# Patient Record
Sex: FEMALE | NOT HISPANIC OR LATINO | Employment: UNEMPLOYED | URBAN - METROPOLITAN AREA
[De-identification: names, ages, dates, MRNs, and addresses within clinical notes are randomized per-mention and may not be internally consistent; named-entity substitution may affect disease eponyms.]

---

## 2017-04-17 ENCOUNTER — HOSPITAL ENCOUNTER (OUTPATIENT)
Facility: HOSPITAL | Age: 32
Discharge: HOME | End: 2017-04-18
Attending: SPECIALIST | Admitting: INTERNAL MEDICINE
Payer: MEDICARE

## 2017-04-17 DIAGNOSIS — R55 SYNCOPE: ICD-10-CM

## 2017-04-17 DIAGNOSIS — R55 SYNCOPE AND COLLAPSE: Primary | ICD-10-CM

## 2017-04-17 DIAGNOSIS — R00.1 BRADYCARDIA: ICD-10-CM

## 2017-04-17 DIAGNOSIS — R42 DIZZINESS: ICD-10-CM

## 2017-04-17 LAB
ALBUMIN SERPL BCP-MCNC: 4.4 G/DL
ALP SERPL-CCNC: 97 U/L
ALT SERPL W/O P-5'-P-CCNC: 20 U/L
ANION GAP SERPL CALC-SCNC: 10 MMOL/L
APTT BLDCRRT: 28 SEC
AST SERPL-CCNC: 21 U/L
B-HCG UR QL: NEGATIVE
BASOPHILS # BLD AUTO: 0.02 K/UL
BASOPHILS NFR BLD: 0.5 %
BILIRUB SERPL-MCNC: 1.1 MG/DL
BILIRUB UR QL STRIP: NEGATIVE
BNP SERPL-MCNC: 35 PG/ML
BUN SERPL-MCNC: 15 MG/DL
CALCIUM SERPL-MCNC: 9.9 MG/DL
CHLORIDE SERPL-SCNC: 103 MMOL/L
CK SERPL-CCNC: 63 U/L
CLARITY UR: CLEAR
CO2 SERPL-SCNC: 29 MMOL/L
COLOR UR: YELLOW
CREAT SERPL-MCNC: 0.9 MG/DL
DIASTOLIC DYSFUNCTION: NO
DIFFERENTIAL METHOD: ABNORMAL
EOSINOPHIL # BLD AUTO: 0.1 K/UL
EOSINOPHIL NFR BLD: 1.4 %
ERYTHROCYTE [DISTWIDTH] IN BLOOD BY AUTOMATED COUNT: 13.2 %
EST. GFR  (AFRICAN AMERICAN): >60 ML/MIN/1.73 M^2
EST. GFR  (NON AFRICAN AMERICAN): >60 ML/MIN/1.73 M^2
GLUCOSE SERPL-MCNC: 87 MG/DL
GLUCOSE UR QL STRIP: NEGATIVE
HCT VFR BLD AUTO: 42.7 %
HGB BLD-MCNC: 14.7 G/DL
HGB UR QL STRIP: NEGATIVE
INR PPP: 1
KETONES UR QL STRIP: NEGATIVE
LEUKOCYTE ESTERASE UR QL STRIP: NEGATIVE
LYMPHOCYTES # BLD AUTO: 1.6 K/UL
LYMPHOCYTES NFR BLD: 38.1 %
MAGNESIUM SERPL-MCNC: 2.3 MG/DL
MCH RBC QN AUTO: 33.3 PG
MCHC RBC AUTO-ENTMCNC: 34.4 %
MCV RBC AUTO: 97 FL
MONOCYTES # BLD AUTO: 0.4 K/UL
MONOCYTES NFR BLD: 8.7 %
NEUTROPHILS # BLD AUTO: 2.1 K/UL
NEUTROPHILS NFR BLD: 51.3 %
NITRITE UR QL STRIP: NEGATIVE
PH UR STRIP: 6 [PH] (ref 5–8)
PLATELET # BLD AUTO: 298 K/UL
PMV BLD AUTO: 10.3 FL
POCT GLUCOSE: 62 MG/DL (ref 70–110)
POTASSIUM SERPL-SCNC: 4.3 MMOL/L
PROT SERPL-MCNC: 7.8 G/DL
PROT UR QL STRIP: NEGATIVE
PROTHROMBIN TIME: 10.8 SEC
RBC # BLD AUTO: 4.42 M/UL
RETIRED EF AND QEF - SEE NOTES: 65 (ref 55–65)
SODIUM SERPL-SCNC: 142 MMOL/L
SP GR UR STRIP: <=1.005 (ref 1–1.03)
TROPONIN I SERPL DL<=0.01 NG/ML-MCNC: <0.006 NG/ML
URN SPEC COLLECT METH UR: ABNORMAL
UROBILINOGEN UR STRIP-ACNC: NEGATIVE EU/DL
WBC # BLD AUTO: 4.15 K/UL

## 2017-04-17 PROCEDURE — 25000003 PHARM REV CODE 250: Performed by: SPECIALIST

## 2017-04-17 PROCEDURE — 93010 ELECTROCARDIOGRAM REPORT: CPT | Mod: ,,, | Performed by: INTERNAL MEDICINE

## 2017-04-17 PROCEDURE — 99204 OFFICE O/P NEW MOD 45 MIN: CPT | Mod: ,,, | Performed by: INTERNAL MEDICINE

## 2017-04-17 PROCEDURE — 93306 TTE W/DOPPLER COMPLETE: CPT

## 2017-04-17 PROCEDURE — 93005 ELECTROCARDIOGRAM TRACING: CPT

## 2017-04-17 PROCEDURE — 81025 URINE PREGNANCY TEST: CPT

## 2017-04-17 PROCEDURE — 85025 COMPLETE CBC W/AUTO DIFF WBC: CPT

## 2017-04-17 PROCEDURE — 85730 THROMBOPLASTIN TIME PARTIAL: CPT

## 2017-04-17 PROCEDURE — 83735 ASSAY OF MAGNESIUM: CPT

## 2017-04-17 PROCEDURE — G0378 HOSPITAL OBSERVATION PER HR: HCPCS

## 2017-04-17 PROCEDURE — 93306 TTE W/DOPPLER COMPLETE: CPT | Mod: 26,,, | Performed by: INTERNAL MEDICINE

## 2017-04-17 PROCEDURE — 83880 ASSAY OF NATRIURETIC PEPTIDE: CPT

## 2017-04-17 PROCEDURE — 82550 ASSAY OF CK (CPK): CPT

## 2017-04-17 PROCEDURE — 85610 PROTHROMBIN TIME: CPT

## 2017-04-17 PROCEDURE — 99285 EMERGENCY DEPT VISIT HI MDM: CPT | Mod: 25

## 2017-04-17 PROCEDURE — 84484 ASSAY OF TROPONIN QUANT: CPT

## 2017-04-17 PROCEDURE — 81003 URINALYSIS AUTO W/O SCOPE: CPT

## 2017-04-17 PROCEDURE — 96361 HYDRATE IV INFUSION ADD-ON: CPT

## 2017-04-17 PROCEDURE — 96360 HYDRATION IV INFUSION INIT: CPT

## 2017-04-17 PROCEDURE — 80053 COMPREHEN METABOLIC PANEL: CPT

## 2017-04-17 PROCEDURE — 82962 GLUCOSE BLOOD TEST: CPT

## 2017-04-17 RX ORDER — ENOXAPARIN SODIUM 100 MG/ML
40 INJECTION SUBCUTANEOUS EVERY 24 HOURS
Status: DISCONTINUED | OUTPATIENT
Start: 2017-04-18 | End: 2017-04-18 | Stop reason: HOSPADM

## 2017-04-17 RX ORDER — ACETAMINOPHEN 325 MG/1
650 TABLET ORAL EVERY 6 HOURS PRN
Status: DISCONTINUED | OUTPATIENT
Start: 2017-04-17 | End: 2017-04-18 | Stop reason: HOSPADM

## 2017-04-17 RX ORDER — ALPRAZOLAM 2 MG/1
TABLET ORAL 2 TIMES DAILY
Status: ON HOLD | COMMUNITY
End: 2019-02-02 | Stop reason: HOSPADM

## 2017-04-17 RX ORDER — SODIUM CHLORIDE 9 MG/ML
INJECTION, SOLUTION INTRAVENOUS CONTINUOUS
Status: DISCONTINUED | OUTPATIENT
Start: 2017-04-17 | End: 2017-04-18 | Stop reason: HOSPADM

## 2017-04-17 RX ORDER — FAMOTIDINE 10 MG/ML
20 INJECTION INTRAVENOUS EVERY 12 HOURS
Status: DISCONTINUED | OUTPATIENT
Start: 2017-04-17 | End: 2017-04-17

## 2017-04-17 RX ORDER — FAMOTIDINE 20 MG/1
20 TABLET, FILM COATED ORAL DAILY
Status: DISCONTINUED | OUTPATIENT
Start: 2017-04-18 | End: 2017-04-18 | Stop reason: HOSPADM

## 2017-04-17 RX ADMIN — SODIUM CHLORIDE 1000 ML: 0.9 INJECTION, SOLUTION INTRAVENOUS at 10:04

## 2017-04-17 RX ADMIN — SODIUM CHLORIDE 1000 ML: 0.9 INJECTION, SOLUTION INTRAVENOUS at 02:04

## 2017-04-17 RX ADMIN — SODIUM CHLORIDE: 0.9 INJECTION, SOLUTION INTRAVENOUS at 10:04

## 2017-04-17 NOTE — IP AVS SNAPSHOT
[address] [place] [address]           Patient Discharge Instructions   Our goal is to set you up for success. This packet includes information on your condition, medications, and your home care.  It will help you care for yourself to prevent having to return to the hospital.     Please ask your nurse if you have any questions.      There are many details to remember when preparing to leave the hospital. Here is what you will need to do:    1. Take your medicine. If you are prescribed medications, review your Medication List on the following pages. You may have new medications to  at the pharmacy and others that you'll need to stop taking. Review the instructions for how and when to take your medications. Talk with your doctor or nurses if you are unsure of what to do.     2. Go to your follow-up appointments. Specific follow-up information is listed in the following pages. Your may be contacted by a nurse or clinical provider about future appointments. Be sure we have all of the phone numbers to reach you. Please contact your provider's office if you are unable to make an appointment.     3. Watch for warning signs. Your doctor or nurse will give you detailed warning signs to watch for and when to call for assistance. These instructions may also include educational information about your condition. If you experience any of warning signs to your health, call your doctor.               ** Verify the list of medication(s) below is accurate and up to date. Carry this with you in case of emergency. If your medications have changed, please notify your healthcare provider.             Medication List      CONTINUE taking these medications        Additional Info                      XANAX 2 MG Tab   Refills:  0   Generic drug:  alprazolam    Instructions:  Take by mouth nightly as needed.     Begin Date    AM    Noon    PM    Bedtime         STOP taking these  "medications     meloxicam 15 MG tablet   Commonly known as:  MOBIC                  Please bring to all follow up appointments:    1. A copy of your discharge instructions.  2. All medicines you are currently taking in their original bottles.  3. Identification and insurance card.    Please arrive 15 minutes ahead of scheduled appointment time.    Please call 24 hours in advance if you must reschedule your appointment and/or time.        Follow-up Information     Follow up with Catalino Mcgrath MD. Schedule an appointment as soon as possible for a visit in 3 days.    Specialty:  Internal Medicine    Contact information:    [address] [place] [id number]  [phone]          Discharge Instructions     Future Orders    Diet general     Questions:    Total calories:      Fat restriction, if any:      Protein restriction, if any:      Na restriction, if any:      Fluid restriction:      Additional restrictions:          Primary Diagnosis     Your primary diagnosis was:  Fainting      Admission Information     Date & Time Provider Department CSN    4/17/2017  9:18 AM Marko Keating MD [facility] -  [id number]      Care Providers     Provider Role Specialty Primary office phone    Marko Keating MD Attending Provider Internal Medicine [phone]    Marko Keating MD Team Attending  Internal Medicine [phone]    Loco Vergara MD Consulting Physician  Cardiology  [phone]      Your Vitals Were     BP Pulse Temp Resp Height Weight    116/69 (BP Location: Right arm, Patient Position: Lying, BP Method: Automatic) 57 98 °F (36.7 °C) (Oral) 18 5' 11" (1.803 m) 70.3 kg (155 lb)    SpO2 BMI             97% 21.62 kg/m2         Recent Lab Values     No lab values to display.      Allergies as of 4/18/2017     No Known Allergies      [facility]     [facility] Nurse Care Line - 24/7 Assistance  Unless otherwise directed by your provider, please contact " [facility], our nurse care line that is available for 24/7 assistance.     Registered nurses in the [facility] provide clinical advisement, health education, appointment booking, and other advisory services.  Call for this free service at [phone].        Advance Directives     An advance directive is a document which, in the event you are no longer able to make decisions for yourself, tells your healthcare team what kind of treatment you do or do not want to receive, or who you would like to make those decisions for you.  If you do not currently have an advance directive, [facility] encourages you to create one.  For more information call:  [phone]), [phone] ([phone]),  or log on to [email or web address].        Smoking Cessation     If you would like to quit smoking:   You may be eligible for free services if you are a [place] resident and started smoking cigarettes before September 1, 1988.  Call the Smoking Cessation Trust ([facility]) toll free at ([phone] 707-6632 or [phone] 106-3465.   Call 0-076-QUIT-NOW if you do not meet the above criteria.   Contact us via email: [email or web address]   View our website for more information: [email or web address]        Language Assistance Services     ATTENTION: Language assistance services are available, free of charge. Please call [phone].      ATENCIÓN: Si habla español, tiene a stark disposición servicios gratuitos de asistencia lingüística. Llame al [phone].     [facility] Ý: N?u b?n nói Ti?ng Vi?t, có các d?ch v? h? tr? ngôn ng? mi?n phí dành cho b?n. G?i s? [phone].        [facility] Sign-Up     Activating your [facility] account is as easy as 1-2-3!     1) Visit [email or web address], select Sign Up Now, enter this activation code and your date of birth, then select Next.  [email or web address]  Expires: 6/2/2017  2:46 PM      2) Create a username and password to use when you visit [facility] in the future and  select a security question in case you lose your password and select Next.    3) Enter your e-mail address and click Sign Up!    Additional Information  If you have questions, please e-mail [email or web address] or call [phone] to talk to our [facility] staff. Remember, [facility] is NOT to be used for urgent needs. For medical emergencies, dial 911.          [facility] complies with applicable Federal civil rights laws and does not discriminate on the basis of race, color, national origin, age, disability, or sex.

## 2017-04-17 NOTE — ED NOTES
"Pt reports this weekend, everything went black and her knees felt weak.   She felt like she was floating and had to sit down.  Her vision felt altered and she saw "speckles".  She had repeated episodes on Saturday with some improvement on Sunday.  On waking this morning, she had no dizziness and no altered vision.  While driving to work, her vision became altered when she turned her head; she describes it as "delayed".  "

## 2017-04-17 NOTE — ED NOTES
Pt resting in ER stretcher, aaox4, rr e/u, NAD noted. Pt remains on cardiac monitor with vss noted. Bed low and locked, call light in reach, side rails up x2. Pt verbalized understanding of status and POC; denies further needs. Visitors at bedside.  Will continue to monitor.

## 2017-04-17 NOTE — CONSULTS
[facility]  Cardiology  Consult Note    Patient Name: Aliyah Up  MRN: [id number]  Admission Date: 2017  Hospital Length of Stay: 0 days  Code Status: No Order   Attending Provider: Danya Del Toro MD   Consulting Provider: Subha Vergara MD  Primary Care Physician: Catalino Mcgrath MD  Principal Problem:<principal problem not specified>    Patient information was obtained from patient and ER records.     Inpatient consult to Cardiology  Consult performed by: SUBHA VERGARA  Consult ordered by: DANYA LAZO        Subjective:     Chief Complaint:  Lightheadedness.     HPI: 30 yo female, PMH anxiety,bipolar and depression, working as receptionalist. Two days ago, started lightheaded and fainting when standing up at home. Associated with black vision for a moment. Felt vision moving behind the turning of head and the head was  Leaving her body. Constant symptoms for a day. Then yesterday, repeat symptoms.   She states that she always has lightheadedness and dizziness when standing up, which could resovle quickly in seconds.  In ER, EKG showed sinus bradycardia at 45 bpm and had one liter of IVF.  Recheck orthostatic sign:  Lying /81 mmHG, HR 45 bpm  Sitting /95 mmHG and HR 68 bpm  Standing /80 mmHG and HR 90 bpm/        Past Medical History:   Diagnosis Date    Anxiety     Bipolar and related disorder     Depression     Memory loss     Mental disorder     Syncope and collapse        Past Surgical History:   Procedure Laterality Date    Breast implants  2005     SECTION, CLASSIC      HYSTERECTOMY         Review of patient's allergies indicates:  No Known Allergies    No current facility-administered medications on file prior to encounter.      Current Outpatient Prescriptions on File Prior to Encounter   Medication Sig    meloxicam (MOBIC) 15 MG tablet Take 1 tablet (15 mg total) by mouth once daily.     Family History     Problem Relation  (Age of Onset)    Hypertension Mother        Social History Main Topics    Smoking status: Former Smoker     Quit date: 2/13/2013    Smokeless tobacco: Not on file    Alcohol use Yes      Comment: OCCASION    Drug use: No    Sexual activity: Yes     Partners: Male     Review of Systems   Constitution: Negative for decreased appetite, diaphoresis, fever, weakness, malaise/fatigue and night sweats.   HENT: Negative for headaches and nosebleeds.    Eyes: Negative for blurred vision and double vision.   Cardiovascular: Positive for near-syncope. Negative for chest pain, claudication, dyspnea on exertion, irregular heartbeat, leg swelling, orthopnea, palpitations, paroxysmal nocturnal dyspnea and syncope.   Respiratory: Negative for cough, shortness of breath, sleep disturbances due to breathing, snoring, sputum production and wheezing.    Endocrine: Negative for cold intolerance and polyuria.   Hematologic/Lymphatic: Does not bruise/bleed easily.   Skin: Negative for rash.   Musculoskeletal: Negative for back pain, falls, joint pain, joint swelling and neck pain.   Gastrointestinal: Negative for abdominal pain, heartburn, nausea and vomiting.   Genitourinary: Negative for dysuria, frequency and hematuria.   Neurological: Positive for dizziness and light-headedness. Negative for difficulty with concentration, focal weakness, numbness and seizures.   Psychiatric/Behavioral: Negative for depression, memory loss and substance abuse. The patient does not have insomnia.    Allergic/Immunologic: Negative for HIV exposure and hives.     Objective:     Vital Signs (Most Recent):  Temp: 97.7 °F (36.5 °C) (04/17/17 0909)  Pulse: 73 (04/17/17 1306)  Resp: 19 (04/17/17 1215)  BP: 121/85 (04/17/17 1306)  SpO2: 100 % (04/17/17 1306) Vital Signs (24h Range):  Temp:  [97.7 °F (36.5 °C)] 97.7 °F (36.5 °C)  Pulse:  [51-73] 73  Resp:  [16-19] 19  SpO2:  [98 %-100 %] 100 %  BP: (115-145)/(68-85) 121/85     Weight: 70.3 kg (155  lb)  Body mass index is 21.62 kg/(m^2).    SpO2: 100 %  O2 Device (Oxygen Therapy): room air    No intake or output data in the 24 hours ending 04/17/17 1400    Lines/Drains/Airways     Peripheral Intravenous Line                 Peripheral IV - Single Lumen 04/17/17 1037 Right Antecubital less than 1 day                Physical Exam   Constitutional: She is oriented to person, place, and time. She appears well-nourished.   HENT:   Head: Normocephalic.   Eyes: Pupils are equal, round, and reactive to light.   Neck: Normal carotid pulses and no JVD present. Carotid bruit is not present. No thyromegaly present.   Cardiovascular: Regular rhythm, normal heart sounds and normal pulses.   No extrasystoles are present. Bradycardia present.  PMI is not displaced.  Exam reveals no gallop and no S3.    No murmur heard.  Pulmonary/Chest: Breath sounds normal. No stridor. No respiratory distress.   Abdominal: Soft. Bowel sounds are normal. There is no tenderness. There is no rebound.   Musculoskeletal: Normal range of motion.   Neurological: She is alert and oriented to person, place, and time.   Skin: Skin is intact. No rash noted.   Psychiatric: Her behavior is normal.       Significant Labs:   ABG: No results for input(s): PH, PCO2, HCO3, POCSATURATED, BE in the last 48 hours., Blood Culture: No results for input(s): LABBLOO in the last 48 hours., BMP:   Recent Labs  Lab 04/17/17  1037   GLU 87      K 4.3      CO2 29   BUN 15   CREATININE 0.9   CALCIUM 9.9   MG 2.3   , CMP   Recent Labs  Lab 04/17/17  1037      K 4.3      CO2 29   GLU 87   BUN 15   CREATININE 0.9   CALCIUM 9.9   PROT 7.8   ALBUMIN 4.4   BILITOT 1.1*   ALKPHOS 97   AST 21   ALT 20   ANIONGAP 10   ESTGFRAFRICA >60   EGFRNONAA >60   , CBC   Recent Labs  Lab 04/17/17  1037   WBC 4.15   HGB 14.7   HCT 42.7      , INR   Recent Labs  Lab 04/17/17  1037   INR 1.0   , Lipid Panel No results for input(s): CHOL, HDL, LDLCALC, TRIG,  CHOLHDL in the last 48 hours. and Troponin   Recent Labs  Lab 04/17/17  1037   TROPONINI <0.006       Significant Imaging: X-Ray: CXR: X-Ray Chest 1 View (CXR):   Results for orders placed or performed during the hospital encounter of 04/17/17   X-Ray Chest 1 View    Narrative    History: Dizziness, shortness of breath    There is cardiomegaly. No infiltrate seen. Haziness of the lower lung fields produced by prominent overlying soft tissues and breast implants. There is moderate scoliosis.    Impression     No acute findings. Cardiomegaly. Scoliosis.      Electronically signed by: CATALINA HARRINGTON MD  Date:     04/17/17  Time:    10:25      Assessment and Plan:     Orthostatic tachycardia symptomatic  baselie bradycardia at 40's.  Anxiety and bipolar.    Plan: IVF   Echo pending  Tele monitor    VTE Risk Mitigation     None          Thank you for your consult. I will follow-up with patient. Please contact us if you have any additional questions.    Loco Vergara MD  Cardiology   [facility]

## 2017-04-17 NOTE — ED PROVIDER NOTES
"SCRIBE #1 NOTE: I, Andrés Champagne, am scribing for, and in the presence of, Liat Del Toro MD. I have scribed the entire note.      History      Chief Complaint   Patient presents with    Dizziness     started 3 days ago, "I blacked out", denies loss of consciousness, felt better yesterday, but this morning feels dizzy again.       Review of patient's allergies indicates:  No Known Allergies     HPI   HPI    2017, 9:56 AM   History obtained from the patient and       History of Present Illness: Aliyah Up is a 31 y.o. female patient with a PMHx of anxiety, who presents to the Emergency Department for dizziness which onset gradually 2 days ago. Sxs are episodic and moderate in severity. Pt reports syncopal event while standing 2 days ago. Pt denies any head trauma or fall. Pt states that she is still feeling dizzy and describes sxs as an "out of body feeling". There are no mitigating or exacerbating factors noted.  Pt denies any fever, N/V/D, HA, SOB, CP, numbness, abd pain, and all other sxs at this time. No further complaints or concerns at this time.       Arrival mode: Personal vehicle      PCP: Catalino Mcgrath MD       Past Medical History:  Past Medical History:   Diagnosis Date    Anxiety     Bipolar and related disorder     Depression     Memory loss     Mental disorder     Syncope and collapse        Past Surgical History:  Past Surgical History:   Procedure Laterality Date    Breast implants  2005     SECTION, CLASSIC  2003    HYSTERECTOMY           Family History:  Family History   Problem Relation Age of Onset    Hypertension Mother        Social History:  Social History     Social History Main Topics    Smoking status: Former Smoker     Quit date: 2013    Smokeless tobacco: unknown    Alcohol use Yes      Comment: OCCASION    Drug use: No    Sexual activity: Yes     Partners: Male       ROS   Review of Systems   Constitutional: Negative " for fever.   HENT: Negative for sore throat.    Respiratory: Negative for shortness of breath.    Cardiovascular: Negative for chest pain, palpitations and leg swelling.   Gastrointestinal: Negative for abdominal pain, diarrhea, nausea and vomiting.   Genitourinary: Negative for dysuria.   Musculoskeletal: Negative for back pain.   Skin: Negative for rash.   Neurological: Positive for dizziness and syncope. Negative for weakness.   Hematological: Does not bruise/bleed easily.       Physical Exam    Initial Vitals   BP Pulse Resp Temp SpO2   04/17/17 0909 04/17/17 0909 04/17/17 0909 04/17/17 0909 04/17/17 0909   145/76 56 16 97.7 °F (36.5 °C) 100 %      Physical Exam  Nursing Notes and Vital Signs Reviewed.  Constitutional: Patient is in no acute distress. Awake and alert. Well-developed and well-nourished.  Head: Atraumatic. Normocephalic.  Eyes: PERRL. EOM intact. Conjunctivae are not pale. No scleral icterus.  ENT: Mucous membranes are moist. Oropharynx is clear and symmetric.    Neck: Supple. Full ROM. No lymphadenopathy.  Cardiovascular: Bradycardia. Regular rhythm. No murmurs, rubs, or gallops. Distal pulses are 2+ and symmetric.  Pulmonary/Chest: No respiratory distress. Clear to auscultation bilaterally. No wheezing, rales, or rhonchi.  Abdominal: Soft and non-distended.  There is no tenderness.  No rebound, guarding, or rigidity. Good bowel sounds.  Genitourinary: No CVA tenderness  Musculoskeletal: Moves all extremities. No obvious deformities. No edema. No calf tenderness.  Skin: Warm and dry.  Neurological:  Alert, awake, and appropriate.  Normal speech.  No acute focal neurological deficits are appreciated.  Psychiatric: Normal affect. Good eye contact. Appropriate in content.    ED Course    Procedures  ED Vital Signs:  Vitals:    04/17/17 0909 04/17/17 1044 04/17/17 1048 04/17/17 1215   BP: (!) 145/76 125/83  117/68   Pulse: (!) 56 61 (!) 52 (!) 54   Resp: 16  16 19   Temp: 97.7 °F (36.5 °C)     "  TempSrc: Oral      SpO2: 100% 98% 100% 99%   Weight: 70.3 kg (155 lb)      Height: 5' 11" (1.803 m)       04/17/17 1302 04/17/17 1304 04/17/17 1306 04/17/17 1458   BP: 115/69 121/75 121/85 120/74   Pulse: (!) 51 (!) 53 73 (!) 45   Resp:    18   Temp:       TempSrc:       SpO2: 98% 99% 100% 100%   Weight:       Height:           Abnormal Lab Results:  Labs Reviewed   CBC W/ AUTO DIFFERENTIAL - Abnormal; Notable for the following:        Result Value    MCH 33.3 (*)     All other components within normal limits   COMPREHENSIVE METABOLIC PANEL - Abnormal; Notable for the following:     Total Bilirubin 1.1 (*)     All other components within normal limits   URINALYSIS - Abnormal; Notable for the following:     Specific Gravity, UA <=1.005 (*)     All other components within normal limits   POCT GLUCOSE - Abnormal; Notable for the following:     POCT Glucose 62 (*)     All other components within normal limits   MAGNESIUM   CK   TROPONIN I   PREGNANCY TEST, URINE RAPID   PROTIME-INR   APTT   B-TYPE NATRIURETIC PEPTIDE   POCT GLUCOSE MONITORING CONTINUOUS        All Lab Results:  Results for orders placed or performed during the hospital encounter of 04/17/17   CBC auto differential   Result Value Ref Range    WBC 4.15 3.90 - 12.70 K/uL    RBC 4.42 4.00 - 5.40 M/uL    Hemoglobin 14.7 12.0 - 16.0 g/dL    Hematocrit 42.7 37.0 - 48.5 %    MCV 97 82 - 98 fL    MCH 33.3 (H) 27.0 - 31.0 pg    MCHC 34.4 32.0 - 36.0 %    RDW 13.2 11.5 - 14.5 %    Platelets 298 150 - 350 K/uL    MPV 10.3 9.2 - 12.9 fL    Gran # 2.1 1.8 - 7.7 K/uL    Lymph # 1.6 1.0 - 4.8 K/uL    Mono # 0.4 0.3 - 1.0 K/uL    Eos # 0.1 0.0 - 0.5 K/uL    Baso # 0.02 0.00 - 0.20 K/uL    Gran% 51.3 38.0 - 73.0 %    Lymph% 38.1 18.0 - 48.0 %    Mono% 8.7 4.0 - 15.0 %    Eosinophil% 1.4 0.0 - 8.0 %    Basophil% 0.5 0.0 - 1.9 %    Differential Method Automated    Comprehensive metabolic panel   Result Value Ref Range    Sodium 142 136 - 145 mmol/L    Potassium 4.3 3.5 - " 5.1 mmol/L    Chloride 103 95 - 110 mmol/L    CO2 29 23 - 29 mmol/L    Glucose 87 70 - 110 mg/dL    BUN, Bld 15 6 - 20 mg/dL    Creatinine 0.9 0.5 - 1.4 mg/dL    Calcium 9.9 8.7 - 10.5 mg/dL    Total Protein 7.8 6.0 - 8.4 g/dL    Albumin 4.4 3.5 - 5.2 g/dL    Total Bilirubin 1.1 (H) 0.1 - 1.0 mg/dL    Alkaline Phosphatase 97 55 - 135 U/L    AST 21 10 - 40 U/L    ALT 20 10 - 44 U/L    Anion Gap 10 8 - 16 mmol/L    eGFR if African American >60 >60 mL/min/1.73 m^2    eGFR if non African American >60 >60 mL/min/1.73 m^2   Magnesium   Result Value Ref Range    Magnesium 2.3 1.6 - 2.6 mg/dL   CK   Result Value Ref Range    CPK 63 20 - 180 U/L   Troponin I   Result Value Ref Range    Troponin I <0.006 0.000 - 0.026 ng/mL   Urinalysis   Result Value Ref Range    Specimen UA Urine, Clean Catch     Color, UA Yellow Yellow, Straw, Bita    Appearance, UA Clear Clear    pH, UA 6.0 5.0 - 8.0    Specific Gravity, UA <=1.005 (A) 1.005 - 1.030    Protein, UA Negative Negative    Glucose, UA Negative Negative    Ketones, UA Negative Negative    Bilirubin (UA) Negative Negative    Occult Blood UA Negative Negative    Nitrite, UA Negative Negative    Urobilinogen, UA Negative <2.0 EU/dL    Leukocytes, UA Negative Negative   Pregnancy, urine rapid   Result Value Ref Range    Preg Test, Ur Negative    Protime-INR   Result Value Ref Range    Prothrombin Time 10.8 9.0 - 12.5 sec    INR 1.0 0.8 - 1.2   APTT   Result Value Ref Range    aPTT 28.0 21.0 - 32.0 sec   Brain natriuretic peptide   Result Value Ref Range    BNP 35 0 - 99 pg/mL   POCT glucose   Result Value Ref Range    POCT Glucose 62 (L) 70 - 110 mg/dL         Imaging Results:  Imaging Results         CT Head Without Contrast (Final result) Result time:  04/17/17 14:56:18    Final result by Lawson Caldwell MD (04/17/17 14:56:18)    Impression:         Negative noncontrast CT scan of the brain.         All CT scans at this facility use dose modulation, iterative  reconstruction, and/or weight based dosing when appropriate to reduce radiation dose to as low as reasonably achievable.       Electronically signed by: MESERET DELGADILLO MD  Date:     04/17/17  Time:    14:56     Narrative:    CT HEAD WITHOUT CONTRAST    Date: 04/17/17 13:55:29    Clinical indication:  Dizziness, syncope     Technique:  Standard noncontrast CT scan of the brain. No previous for comparison.     Findings:  The ventricles are nondilated. Gray and white matter structures reveal normal attenuation. No hemorrhage, mass effect or edema is identified.     The skull and skull base are unremarkable.            X-Ray Chest 1 View (Final result) Result time:  04/17/17 10:25:41    Final result by Mihai Harrington MD (04/17/17 10:25:41)    Impression:     No acute findings. Cardiomegaly. Scoliosis.      Electronically signed by: MIHAI HARRINGTON MD  Date:     04/17/17  Time:    10:25     Narrative:    History: Dizziness, shortness of breath    There is cardiomegaly. No infiltrate seen. Haziness of the lower lung fields produced by prominent overlying soft tissues and breast implants. There is moderate scoliosis.                    The EKG was ordered, reviewed, and independently interpreted by the ED provider.  Interpretation time: 10:17  Rate: 45 BPM  Rhythm: sinus bradycardia with arrhythmia  Interpretation: Possible LAE. No STEMI.      The Emergency Provider reviewed the vital signs and test results, which are outlined above.    ED Discussion     1:27 PM: Dr. Del Toro discussed the pt's case with Dr. Vergara (Cardiology) who recommends 1 L of fluids and that pt is stable for discharge with f/u for Holter monitor or tilt table test. Pt's HR did change when Dr. Vergara performed orthostatics.    2:46 PM: Re-evaluated pt. I have discussed test results, shared treatment plan, and the need for admission with patient and family at bedside. Pt and family express understanding at this time and agree with all information. All  questions answered. Pt and family have no further questions or concerns at this time. Pt is ready for admit.    2:57 PM: Discussed case with Amos Rosario NP (Hospital Medicine). Amos agrees with current care and management of pt and accepts admission.   Admitting Service: Hospital medicine   Admitting Physician: Dr. Keating  Admit to: Observation      ED Medication(s):  Medications   enoxaparin injection 40 mg (not administered)   famotidine (PF) 20 mg/2 mL injection 20 mg (not administered)   0.9%  NaCl infusion (not administered)   sodium chloride 0.9% bolus 1,000 mL (0 mLs Intravenous Stopped 4/17/17 1327)   sodium chloride 0.9% bolus 1,000 mL (1,000 mLs Intravenous New Bag 4/17/17 1453)       New Prescriptions    No medications on file             Medical Decision Making              Scribe Attestation:   Scribe #1: I performed the above scribed service and the documentation accurately describes the services I performed. I attest to the accuracy of the note.    Attending:   Physician Attestation Statement for Scribe #1: I, Liat Del Toro MD, personally performed the services described in this documentation, as scribed by Andrés Champagne, in my presence, and it is both accurate and complete.          Clinical Impression       ICD-10-CM ICD-9-CM   1. Syncope and collapse R55 780.2   2. Dizziness R42 780.4   3. Bradycardia R00.1 427.89       Disposition:   Disposition: Placed in Observation  Condition: Fair         Liat Del Toro MD  04/17/17 1189

## 2017-04-18 VITALS
WEIGHT: 155 LBS | HEART RATE: 57 BPM | TEMPERATURE: 98 F | OXYGEN SATURATION: 97 % | SYSTOLIC BLOOD PRESSURE: 116 MMHG | RESPIRATION RATE: 18 BRPM | HEIGHT: 71 IN | BODY MASS INDEX: 21.7 KG/M2 | DIASTOLIC BLOOD PRESSURE: 69 MMHG

## 2017-04-18 LAB
ALBUMIN SERPL BCP-MCNC: 3.2 G/DL
ALP SERPL-CCNC: 76 U/L
ALT SERPL W/O P-5'-P-CCNC: 16 U/L
ANION GAP SERPL CALC-SCNC: 8 MMOL/L
AST SERPL-CCNC: 14 U/L
BASOPHILS # BLD AUTO: 0.03 K/UL
BASOPHILS NFR BLD: 0.7 %
BILIRUB SERPL-MCNC: 0.2 MG/DL
BUN SERPL-MCNC: 15 MG/DL
CALCIUM SERPL-MCNC: 8.4 MG/DL
CHLORIDE SERPL-SCNC: 110 MMOL/L
CO2 SERPL-SCNC: 24 MMOL/L
CREAT SERPL-MCNC: 0.9 MG/DL
DIFFERENTIAL METHOD: ABNORMAL
EOSINOPHIL # BLD AUTO: 0.1 K/UL
EOSINOPHIL NFR BLD: 1.5 %
ERYTHROCYTE [DISTWIDTH] IN BLOOD BY AUTOMATED COUNT: 13 %
EST. GFR  (AFRICAN AMERICAN): >60 ML/MIN/1.73 M^2
EST. GFR  (NON AFRICAN AMERICAN): >60 ML/MIN/1.73 M^2
GLUCOSE SERPL-MCNC: 99 MG/DL
HCT VFR BLD AUTO: 37.4 %
HGB BLD-MCNC: 12.5 G/DL
LYMPHOCYTES # BLD AUTO: 1.8 K/UL
LYMPHOCYTES NFR BLD: 39.8 %
MCH RBC QN AUTO: 32.7 PG
MCHC RBC AUTO-ENTMCNC: 33.4 %
MCV RBC AUTO: 98 FL
MONOCYTES # BLD AUTO: 0.4 K/UL
MONOCYTES NFR BLD: 8.8 %
NEUTROPHILS # BLD AUTO: 2.2 K/UL
NEUTROPHILS NFR BLD: 49.2 %
PLATELET # BLD AUTO: 259 K/UL
PMV BLD AUTO: 10.2 FL
POTASSIUM SERPL-SCNC: 4.3 MMOL/L
PROT SERPL-MCNC: 5.8 G/DL
RBC # BLD AUTO: 3.82 M/UL
SODIUM SERPL-SCNC: 142 MMOL/L
TROPONIN I SERPL DL<=0.01 NG/ML-MCNC: <0.006 NG/ML
TROPONIN I SERPL DL<=0.01 NG/ML-MCNC: <0.006 NG/ML
WBC # BLD AUTO: 4.55 K/UL

## 2017-04-18 PROCEDURE — 36415 COLL VENOUS BLD VENIPUNCTURE: CPT

## 2017-04-18 PROCEDURE — 25000003 PHARM REV CODE 250: Performed by: NURSE PRACTITIONER

## 2017-04-18 PROCEDURE — 84484 ASSAY OF TROPONIN QUANT: CPT | Mod: 91

## 2017-04-18 PROCEDURE — 80053 COMPREHEN METABOLIC PANEL: CPT

## 2017-04-18 PROCEDURE — 84484 ASSAY OF TROPONIN QUANT: CPT

## 2017-04-18 PROCEDURE — 85025 COMPLETE CBC W/AUTO DIFF WBC: CPT

## 2017-04-18 PROCEDURE — G0378 HOSPITAL OBSERVATION PER HR: HCPCS

## 2017-04-18 RX ADMIN — FAMOTIDINE 20 MG: 20 TABLET ORAL at 08:04

## 2017-04-18 NOTE — H&P
"[facility] Hospital Medicine  History & Physical    Patient Name: Aliyah Up  MRN: [id number]  Admission Date: 2017  Attending Physician: Marko Keating MD   Primary Care Provider: Catalino Mcgrath MD         Patient information was obtained from patient and ER records.     Subjective:     Principal Problem:Syncope and collapse    Chief Complaint:   Chief Complaint   Patient presents with    Dizziness     started 3 days ago, "I blacked out", denies loss of consciousness, felt better yesterday, but this morning feels dizzy again.        HPI: Aliyah Up is a 32 yo female with a PMH of bipolar, anxiety, depression who presented to the ER with complaints of having syncopal episodes that began 3 days ago. The patient has been worked up in the past by Dr. Bradley for syncope and collapse. The patient reports that she gets dizzy while sitting or standing and feels like she is having an "out of body experience". The patient denies any modifying factors. Patient denies fever, chills, CP, cough, dudley, pnd, orthopnea, palpitations, diaphoresis, headache, blurred vision, numbness, tingling, localized weakness, n/v/d, abdominal pain, blood in stools, melena, hematemesis, urinary frequency, urgency, dysuria or hematuria. The patient noted to be bradycardic in the 50's. Cardiology was consulted.         Past Medical History:   Diagnosis Date    Anxiety     Bipolar and related disorder     Depression     Memory loss     Mental disorder     Syncope and collapse        Past Surgical History:   Procedure Laterality Date    Breast implants       SECTION, CLASSIC  2003    HYSTERECTOMY         Review of patient's allergies indicates:  No Known Allergies    No current facility-administered medications on file prior to encounter.      Current Outpatient Prescriptions on File Prior to Encounter   Medication Sig    meloxicam (MOBIC) 15 MG tablet Take 1 tablet (15 mg total) by mouth " once daily.     Family History     Problem Relation (Age of Onset)    Hypertension Mother        Social History Main Topics    Smoking status: Former Smoker     Quit date: 2/13/2013    Smokeless tobacco: Not on file    Alcohol use Yes      Comment: OCCASION    Drug use: No    Sexual activity: Yes     Partners: Male     Review of Systems   Constitutional: Negative for activity change, appetite change, chills, diaphoresis, fatigue, fever and unexpected weight change.   HENT: Negative for congestion, drooling, facial swelling, rhinorrhea, sinus pressure, sneezing and sore throat.    Eyes: Negative for discharge, redness, itching and visual disturbance.   Respiratory: Negative for apnea, cough, choking, chest tightness, shortness of breath, wheezing and stridor.    Cardiovascular: Negative for chest pain, palpitations and leg swelling.   Gastrointestinal: Negative for abdominal distention, abdominal pain, anal bleeding, blood in stool, constipation, diarrhea, nausea and vomiting.   Genitourinary: Negative for decreased urine volume, difficulty urinating, dysuria, frequency, hematuria, pelvic pain, urgency, vaginal bleeding and vaginal discharge.   Musculoskeletal: Negative for arthralgias, back pain, gait problem, joint swelling, myalgias, neck pain and neck stiffness.   Skin: Negative for color change, pallor, rash and wound.   Neurological: Positive for dizziness and syncope. Negative for seizures, facial asymmetry, speech difficulty, weakness, light-headedness, numbness and headaches.   Psychiatric/Behavioral: Negative for agitation, confusion, hallucinations and suicidal ideas.   All other systems reviewed and are negative.    Objective:     Vital Signs (Most Recent):  Temp: 97.7 °F (36.5 °C) (04/17/17 0909)  Pulse: (!) 52 (04/17/17 1831)  Resp: 18 (04/17/17 1831)  BP: 112/62 (04/17/17 1831)  SpO2: 99 % (04/17/17 1831) Vital Signs (24h Range):  Temp:  [97.7 °F (36.5 °C)] 97.7 °F (36.5 °C)  Pulse:  [45-73]  52  Resp:  [16-20] 18  SpO2:  [98 %-100 %] 99 %  BP: (111-145)/(62-85) 112/62     Weight: 70.3 kg (155 lb)  Body mass index is 21.62 kg/(m^2).    Physical Exam   Constitutional: She is oriented to person, place, and time. She appears well-developed and well-nourished.   HENT:   Head: Normocephalic and atraumatic.   Eyes: Conjunctivae and EOM are normal. Pupils are equal, round, and reactive to light.   Neck: Normal range of motion. Neck supple.   Cardiovascular: Normal rate, regular rhythm, normal heart sounds and intact distal pulses.    No murmur heard.  Pulmonary/Chest: Effort normal and breath sounds normal. No respiratory distress.   Abdominal: Soft. Bowel sounds are normal. She exhibits no distension. There is no tenderness.   Musculoskeletal: Normal range of motion. She exhibits no edema, tenderness or deformity.   Neurological: She is alert and oriented to person, place, and time. She has normal reflexes.   Skin: Skin is warm and dry. No erythema.   Psychiatric: She has a normal mood and affect. Her behavior is normal.   Nursing note and vitals reviewed.       Significant Labs: All pertinent labs within the past 24 hours have been reviewed.    Significant Imaging:   Imaging Results         CT Head Without Contrast (Final result) Result time:  04/17/17 14:56:18    Final result by Meseret Delgadillo MD (04/17/17 14:56:18)    Impression:         Negative noncontrast CT scan of the brain.         All CT scans at this facility use dose modulation, iterative reconstruction, and/or weight based dosing when appropriate to reduce radiation dose to as low as reasonably achievable.       Electronically signed by: MESERET DELGADILLO MD  Date:     04/17/17  Time:    14:56     Narrative:    CT HEAD WITHOUT CONTRAST    Date: 04/17/17 13:55:29    Clinical indication:  Dizziness, syncope     Technique:  Standard noncontrast CT scan of the brain. No previous for comparison.     Findings:  The ventricles are nondilated. Gray  and white matter structures reveal normal attenuation. No hemorrhage, mass effect or edema is identified.     The skull and skull base are unremarkable.            X-Ray Chest 1 View (Final result) Result time:  04/17/17 10:25:41    Final result by Mihai Cooper MD (04/17/17 10:25:41)    Impression:     No acute findings. Cardiomegaly. Scoliosis.      Electronically signed by: MIHAI COOPER MD  Date:     04/17/17  Time:    10:25     Narrative:    History: Dizziness, shortness of breath    There is cardiomegaly. No infiltrate seen. Haziness of the lower lung fields produced by prominent overlying soft tissues and breast implants. There is moderate scoliosis.                 Assessment/Plan:     * Syncope and collapse  - Admit to observation   - orthostatic BP every shift  - ECHO  - carotid US  - neuro checks  - IVF's  - Cardiology consulted        Bradycardia  - cardiac monitoring   - Cardiology consulted       VTE Risk Mitigation         Ordered     enoxaparin injection 40 mg  Daily     Route:  Subcutaneous        04/17/17 9242        Catalino Stinson NP  Department of Hospital Medicine   [facility]

## 2017-04-18 NOTE — DISCHARGE SUMMARY
"[facility] Hospital Medicine  Discharge Summary      Patient Name: Aliyah Up  MRN: [id number]  Admission Date: 4/17/2017  Hospital Length of Stay: 0 days  Discharge Date and Time: 4/18/2017  3:11 PM  Attending Physician: No att. providers found   Discharging Provider: Catalino Stinson NP  Primary Care Provider: Catalino Mcgrath MD      HPI:   Aliyah Up is a 30 yo female with a PMH of bipolar, anxiety, depression who presented to the ER with complaints of having syncopal episodes that began 3 days ago. The patient has been worked up in the past by Dr. Bradley for syncope and collapse. The patient reports that she gets dizzy while sitting or standing and feels like she is having an "out of body experience". The patient denies any modifying factors. Patient denies fever, chills, CP, cough, dudley, pnd, orthopnea, palpitations, diaphoresis, headache, blurred vision, numbness, tingling, localized weakness, n/v/d, abdominal pain, blood in stools, melena, hematemesis, urinary frequency, urgency, dysuria or hematuria. The patient noted to be bradycardic in the 50's. Cardiology was consulted.         * No surgery found *      Indwelling Lines/Drains at time of discharge:   Lines/Drains/Airways          No matching active lines, drains, or airways        Hospital Course:   4/18/17- Pt reports symptoms somewhat improved but still "blacking out". ECHO and carotid US negative. Pt is not orthostatic. Case was discussed with Cardiology who wants to do a tilt table test tomorrow. The patient was seen and examined today and determined to be suitable for discharge. The patient will follow up with her PCP and is scheduled for a tilt table test tomorrow with Dr. Vergara at 1pm.      Consults:   Consults         Status Ordering Provider     Inpatient consult to Cardiology  Once     Provider:  (Not yet assigned)    DANYA Hutton          Significant Diagnostic Studies:   Imaging Results         US " Carotid Bilateral (Final result) Result time:  04/17/17 23:15:58    Final result by Mihai Mojica MD (04/17/17 23:15:58)    Impression:     Unremarkable exam.      Electronically signed by: MIHAI MOJICA MD  Date:     04/17/17  Time:    23:15     Narrative:    Examination: Ultrasound carotid Doppler bilateral.    Clinical Indication: Syncope    Findings: Normal velocities, waveforms, and ratios. No intimal thickening or plaque deposition.    Antegrade flow is noted in both vertebral arteries.            CT Head Without Contrast (Final result) Result time:  04/17/17 14:56:18    Final result by Meseret Delgadillo MD (04/17/17 14:56:18)    Impression:         Negative noncontrast CT scan of the brain.         All CT scans at this facility use dose modulation, iterative reconstruction, and/or weight based dosing when appropriate to reduce radiation dose to as low as reasonably achievable.       Electronically signed by: MESERET DELGADILLO MD  Date:     04/17/17  Time:    14:56     Narrative:    CT HEAD WITHOUT CONTRAST    Date: 04/17/17 13:55:29    Clinical indication:  Dizziness, syncope     Technique:  Standard noncontrast CT scan of the brain. No previous for comparison.     Findings:  The ventricles are nondilated. Gray and white matter structures reveal normal attenuation. No hemorrhage, mass effect or edema is identified.     The skull and skull base are unremarkable.            X-Ray Chest 1 View (Final result) Result time:  04/17/17 10:25:41    Final result by Mihai Harrington MD (04/17/17 10:25:41)    Impression:     No acute findings. Cardiomegaly. Scoliosis.      Electronically signed by: MIHAI HARRINGTON MD  Date:     04/17/17  Time:    10:25     Narrative:    History: Dizziness, shortness of breath    There is cardiomegaly. No infiltrate seen. Haziness of the lower lung fields produced by prominent overlying soft tissues and breast implants. There is moderate scoliosis.                 Pending  Diagnostic Studies:     None        Final Active Diagnoses:    Diagnosis Date Noted POA    PRINCIPAL PROBLEM:  Syncope and collapse [R55] 08/13/2013 Yes    Bradycardia [R00.1] 04/17/2017 Unknown      Problems Resolved During this Admission:    Diagnosis Date Noted Date Resolved POA      No new Assessment & Plan notes have been filed under this hospital service since the last note was generated.  Service: Hospital Medicine      Discharged Condition: stable    Disposition: Home or Self Care    Follow Up:  Follow-up Information     Follow up with Catalino Mcgrath MD. Schedule an appointment as soon as possible for a visit in 3 days.    Specialty:  Internal Medicine    Contact information:    [address] [address]  [phone]          Patient Instructions:     Diet general       Medications:  Reconciled Home Medications:   Discharge Medication List as of 4/18/2017  3:04 PM      CONTINUE these medications which have NOT CHANGED    Details   alprazolam (XANAX) 2 MG Tab Take by mouth nightly as needed., Until Discontinued, Historical Med         STOP taking these medications       meloxicam (MOBIC) 15 MG tablet Comments:   Reason for Stopping:             Time spent on the discharge of patient: > 30 minutes    Catalino Stinson NP  Department of Hospital Medicine  [facility]

## 2017-04-18 NOTE — DISCHARGE INSTRUCTIONS
Plan for a tilt table test with Cardiology tomorrow at 1 pm. Please check in by 11:30 am. Light breakfast.

## 2017-04-18 NOTE — SUBJECTIVE & OBJECTIVE
Past Medical History:   Diagnosis Date    Anxiety     Bipolar and related disorder     Depression     Memory loss     Mental disorder     Syncope and collapse        Past Surgical History:   Procedure Laterality Date    Breast implants  2005     SECTION, CLASSIC  2003    HYSTERECTOMY         Review of patient's allergies indicates:  No Known Allergies    No current facility-administered medications on file prior to encounter.      Current Outpatient Prescriptions on File Prior to Encounter   Medication Sig    meloxicam (MOBIC) 15 MG tablet Take 1 tablet (15 mg total) by mouth once daily.     Family History     Problem Relation (Age of Onset)    Hypertension Mother        Social History Main Topics    Smoking status: Former Smoker     Quit date: 2013    Smokeless tobacco: Not on file    Alcohol use Yes      Comment: OCCASION    Drug use: No    Sexual activity: Yes     Partners: Male     Review of Systems   Constitutional: Negative for activity change, appetite change, chills, diaphoresis, fatigue, fever and unexpected weight change.   HENT: Negative for congestion, drooling, facial swelling, rhinorrhea, sinus pressure, sneezing and sore throat.    Eyes: Negative for discharge, redness, itching and visual disturbance.   Respiratory: Negative for apnea, cough, choking, chest tightness, shortness of breath, wheezing and stridor.    Cardiovascular: Negative for chest pain, palpitations and leg swelling.   Gastrointestinal: Negative for abdominal distention, abdominal pain, anal bleeding, blood in stool, constipation, diarrhea, nausea and vomiting.   Genitourinary: Negative for decreased urine volume, difficulty urinating, dysuria, frequency, hematuria, pelvic pain, urgency, vaginal bleeding and vaginal discharge.   Musculoskeletal: Negative for arthralgias, back pain, gait problem, joint swelling, myalgias, neck pain and neck stiffness.   Skin: Negative for color change, pallor, rash and  wound.   Neurological: Positive for dizziness and syncope. Negative for seizures, facial asymmetry, speech difficulty, weakness, light-headedness, numbness and headaches.   Psychiatric/Behavioral: Negative for agitation, confusion, hallucinations and suicidal ideas.   All other systems reviewed and are negative.    Objective:     Vital Signs (Most Recent):  Temp: 97.7 °F (36.5 °C) (04/17/17 0909)  Pulse: (!) 52 (04/17/17 1831)  Resp: 18 (04/17/17 1831)  BP: 112/62 (04/17/17 1831)  SpO2: 99 % (04/17/17 1831) Vital Signs (24h Range):  Temp:  [97.7 °F (36.5 °C)] 97.7 °F (36.5 °C)  Pulse:  [45-73] 52  Resp:  [16-20] 18  SpO2:  [98 %-100 %] 99 %  BP: (111-145)/(62-85) 112/62     Weight: 70.3 kg (155 lb)  Body mass index is 21.62 kg/(m^2).    Physical Exam   Constitutional: She is oriented to person, place, and time. She appears well-developed and well-nourished.   HENT:   Head: Normocephalic and atraumatic.   Eyes: Conjunctivae and EOM are normal. Pupils are equal, round, and reactive to light.   Neck: Normal range of motion. Neck supple.   Cardiovascular: Normal rate, regular rhythm, normal heart sounds and intact distal pulses.    No murmur heard.  Pulmonary/Chest: Effort normal and breath sounds normal. No respiratory distress.   Abdominal: Soft. Bowel sounds are normal. She exhibits no distension. There is no tenderness.   Musculoskeletal: Normal range of motion. She exhibits no edema, tenderness or deformity.   Neurological: She is alert and oriented to person, place, and time. She has normal reflexes.   Skin: Skin is warm and dry. No erythema.   Psychiatric: She has a normal mood and affect. Her behavior is normal.   Nursing note and vitals reviewed.       Significant Labs: All pertinent labs within the past 24 hours have been reviewed.    Significant Imaging:   Imaging Results         CT Head Without Contrast (Final result) Result time:  04/17/17 14:56:18    Final result by Lawson Caldwell MD (04/17/17  14:56:18)    Impression:         Negative noncontrast CT scan of the brain.         All CT scans at this facility use dose modulation, iterative reconstruction, and/or weight based dosing when appropriate to reduce radiation dose to as low as reasonably achievable.       Electronically signed by: MESERET DELGADILLO MD  Date:     04/17/17  Time:    14:56     Narrative:    CT HEAD WITHOUT CONTRAST    Date: 04/17/17 13:55:29    Clinical indication:  Dizziness, syncope     Technique:  Standard noncontrast CT scan of the brain. No previous for comparison.     Findings:  The ventricles are nondilated. Gray and white matter structures reveal normal attenuation. No hemorrhage, mass effect or edema is identified.     The skull and skull base are unremarkable.            X-Ray Chest 1 View (Final result) Result time:  04/17/17 10:25:41    Final result by Mihai Cooper MD (04/17/17 10:25:41)    Impression:     No acute findings. Cardiomegaly. Scoliosis.      Electronically signed by: MIHAI COOPER MD  Date:     04/17/17  Time:    10:25     Narrative:    History: Dizziness, shortness of breath    There is cardiomegaly. No infiltrate seen. Haziness of the lower lung fields produced by prominent overlying soft tissues and breast implants. There is moderate scoliosis.

## 2017-04-18 NOTE — ASSESSMENT & PLAN NOTE
- Admit to observation   - orthostatic BP every shift  - ECHO  - carotid US  - neuro checks  - IVF's  - Cardiology consulted

## 2017-04-18 NOTE — PLAN OF CARE
Problem: Patient Care Overview  Goal: Plan of Care Review  Outcome: Ongoing (interventions implemented as appropriate)  Free of falls/injury during shift  No c/o pain  Troponin negative  Continuous IVFs infusing  Syncope; Orthostatic VS  SB on telemetry  Safety interventions in place

## 2017-04-18 NOTE — PROGRESS NOTES
D/C IV, catheter in tact.D/C tele monitor. Tilt test tomm @ 1130. Patient aware.   Pt given prescription information and encouraged to follow up as directed.   Pt verbalize understanding of discharge instructions, both written and verbal.

## 2017-04-19 ENCOUNTER — TELEPHONE (OUTPATIENT)
Dept: CARDIOLOGY | Facility: CLINIC | Age: 32
End: 2017-04-19

## 2017-04-19 ENCOUNTER — SURGERY (OUTPATIENT)
Age: 32
End: 2017-04-19

## 2017-04-19 ENCOUNTER — HOSPITAL ENCOUNTER (OUTPATIENT)
Facility: HOSPITAL | Age: 32
Discharge: HOME | End: 2017-04-19
Attending: INTERNAL MEDICINE | Admitting: INTERNAL MEDICINE
Payer: MEDICARE

## 2017-04-19 VITALS
TEMPERATURE: 98 F | OXYGEN SATURATION: 100 % | DIASTOLIC BLOOD PRESSURE: 74 MMHG | HEART RATE: 55 BPM | BODY MASS INDEX: 21.7 KG/M2 | RESPIRATION RATE: 18 BRPM | WEIGHT: 155 LBS | SYSTOLIC BLOOD PRESSURE: 118 MMHG | HEIGHT: 71 IN

## 2017-04-19 DIAGNOSIS — R55 SYNCOPE AND COLLAPSE: ICD-10-CM

## 2017-04-19 PROCEDURE — 93660 TILT TABLE EVALUATION: CPT | Mod: 26,,, | Performed by: INTERNAL MEDICINE

## 2017-04-19 PROCEDURE — 93660 TILT TABLE EVALUATION: CPT

## 2017-04-19 NOTE — IP AVS SNAPSHOT
[facility]  [id number] [place] [address] [address] [place] [address]           Patient Discharge Instructions   Our goal is to set you up for success. This packet includes information on your condition, medications, and your home care.  It will help you care for yourself to prevent having to return to the hospital.     Please ask your nurse if you have any questions.      There are many details to remember when preparing to leave the hospital. Here is what you will need to do:    1. Take your medicine. If you are prescribed medications, review your Medication List on the following pages. You may have new medications to  at the pharmacy and others that you'll need to stop taking. Review the instructions for how and when to take your medications. Talk with your doctor or nurses if you are unsure of what to do.     2. Go to your follow-up appointments. Specific follow-up information is listed in the following pages. Your may be contacted by a nurse or clinical provider about future appointments. Be sure we have all of the phone numbers to reach you. Please contact your provider's office if you are unable to make an appointment.     3. Watch for warning signs. Your doctor or nurse will give you detailed warning signs to watch for and when to call for assistance. These instructions may also include educational information about your condition. If you experience any of warning signs to your health, call your doctor.           [facility]  Unless otherwise directed by your provider, please   contact [facility], our nurse care line   that is available for 24/7 assistance.     [phone] (toll-free)     Registered nurses in the [facility]   provide: appointment scheduling, clinical advisement, health education, and other advisory services.                  ** Verify the list of medication(s) below is accurate and up to date. Carry this with you in case of emergency. If your  "medications have changed, please notify your healthcare provider.             Medication List      ASK your doctor about these medications        Additional Info                      XANAX 2 MG Tab   Refills:  0   Generic drug:  alprazolam    Instructions:  Take by mouth nightly as needed.     Begin Date    AM    Noon    PM    Bedtime                  Please bring to all follow up appointments:    1. A copy of your discharge instructions.  2. All medicines you are currently taking in their original bottles.  3. Identification and insurance card.    Please arrive 15 minutes ahead of scheduled appointment time.    Please call 24 hours in advance if you must reschedule your appointment and/or time.        Your Future Surgeries/Procedures     Apr 19, 2017   Surgery with Loco Vergara MD   [facility])    [address] [facility] [id number] [phone]                  Admission Information     Date & Time Provider Department CSN    4/19/2017 11:22 AM Loco Vergara MD [facility] [id number]      Care Providers     Provider Role Specialty Primary office phone    Loco Vergara MD Attending Provider Cardiology [phone]      Your Vitals Were     BP Pulse Temp Resp Height Weight    118/74 (BP Location: Right arm, Patient Position: Lying) 55 98.1 °F (36.7 °C) (Oral) 18 5' 11" (1.803 m) 70.3 kg (155 lb)    SpO2 BMI             100% 21.62 kg/m2         Recent Lab Values     No lab values to display.      Allergies as of 4/19/2017     No Known Allergies      Advance Directives     An advance directive is a document which, in the event you are no longer able to make decisions for yourself, tells your healthcare team what kind of treatment you do or do not want to receive, or who you would like to make those decisions for you.  If you do not currently have an advance directive, [facility] encourages you to create one.  For more information call:  [phone] " [phone] ([phone]), [phone] ([phone]),  or log on to [email or web address].        Smoking Cessation     If you would like to quit smoking:   You may be eligible for free services if you are a [place] resident and started smoking cigarettes before September 1, 1988.  Call the Smoking Cessation Trust ([facility]) toll free at ([phone] [phone] or ([phone]) [phone].   Call 8-858-QUIT-NOW if you do not meet the above criteria.   Contact us via email: [email or web address]   View our website for more information: [email or web address]        Language Assistance Services     ATTENTION: Language assistance services are available, free of charge. Please call [phone].      ATENCIÓN: Si habla zahraa, tiene a stark disposición servicios gratuitos de asistencia lingüística. Llame al [phone].     CHÚ Ý: N?u b?n nói Ti?ng Vi?t, có các d?ch v? h? tr? ngôn ng? mi?n phí dành cho b?n. G?i s? [phone].        [facility] Sign-Up     Activating your [facility] account is as easy as 1-2-3!     1) Visit [email or web address], select Sign Up Now, enter this activation code and your date of birth, then select Next.  [email or web address]  Expires: 6/2/2017  2:46 PM      2) Create a username and password to use when you visit [facility] in the future and select a security question in case you lose your password and select Next.    3) Enter your e-mail address and click Sign Up!    Additional Information  If you have questions, please e-mail [email or web address] or call [phone] to talk to our [facility] staff. Remember, [facility] is NOT to be used for urgent needs. For medical emergencies, dial 911.          [facility] complies with applicable Federal civil rights laws and does not discriminate on the basis of race, color, national origin, age, disability, or sex.

## 2017-04-19 NOTE — TELEPHONE ENCOUNTER
Gave patient her test results as requested. She will see Dr Vergara on 5/1 to discuss all results. She verbalized understanding

## 2017-04-19 NOTE — H&P (VIEW-ONLY)
[facility]  Cardiology  Consult Note    Patient Name: Aliyah Up  MRN: [id number]  Admission Date: 2017  Hospital Length of Stay: 0 days  Code Status: No Order   Attending Provider: Danya Del Toro MD   Consulting Provider: Subha Vergara MD  Primary Care Physician: Catalino Mcgrath MD  Principal Problem:<principal problem not specified>    Patient information was obtained from patient and ER records.     Inpatient consult to Cardiology  Consult performed by: SUBHA VERGARA  Consult ordered by: DANYA LAZO        Subjective:     Chief Complaint:  Lightheadedness.     HPI: 30 yo female, PMH anxiety,bipolar and depression, working as receptionalist. Two days ago, started lightheaded and fainting when standing up at home. Associated with black vision for a moment. Felt vision moving behind the turning of head and the head was  Leaving her body. Constant symptoms for a day. Then yesterday, repeat symptoms.   She states that she always has lightheadedness and dizziness when standing up, which could resovle quickly in seconds.  In ER, EKG showed sinus bradycardia at 45 bpm and had one liter of IVF.  Recheck orthostatic sign:  Lying /81 mmHG, HR 45 bpm  Sitting /95 mmHG and HR 68 bpm  Standing /80 mmHG and HR 90 bpm/        Past Medical History:   Diagnosis Date    Anxiety     Bipolar and related disorder     Depression     Memory loss     Mental disorder     Syncope and collapse        Past Surgical History:   Procedure Laterality Date    Breast implants  2005     SECTION, CLASSIC      HYSTERECTOMY         Review of patient's allergies indicates:  No Known Allergies    No current facility-administered medications on file prior to encounter.      Current Outpatient Prescriptions on File Prior to Encounter   Medication Sig    meloxicam (MOBIC) 15 MG tablet Take 1 tablet (15 mg total) by mouth once daily.     Family History     Problem Relation  (Age of Onset)    Hypertension Mother        Social History Main Topics    Smoking status: Former Smoker     Quit date: 2/13/2013    Smokeless tobacco: Not on file    Alcohol use Yes      Comment: OCCASION    Drug use: No    Sexual activity: Yes     Partners: Male     Review of Systems   Constitution: Negative for decreased appetite, diaphoresis, fever, weakness, malaise/fatigue and night sweats.   HENT: Negative for headaches and nosebleeds.    Eyes: Negative for blurred vision and double vision.   Cardiovascular: Positive for near-syncope. Negative for chest pain, claudication, dyspnea on exertion, irregular heartbeat, leg swelling, orthopnea, palpitations, paroxysmal nocturnal dyspnea and syncope.   Respiratory: Negative for cough, shortness of breath, sleep disturbances due to breathing, snoring, sputum production and wheezing.    Endocrine: Negative for cold intolerance and polyuria.   Hematologic/Lymphatic: Does not bruise/bleed easily.   Skin: Negative for rash.   Musculoskeletal: Negative for back pain, falls, joint pain, joint swelling and neck pain.   Gastrointestinal: Negative for abdominal pain, heartburn, nausea and vomiting.   Genitourinary: Negative for dysuria, frequency and hematuria.   Neurological: Positive for dizziness and light-headedness. Negative for difficulty with concentration, focal weakness, numbness and seizures.   Psychiatric/Behavioral: Negative for depression, memory loss and substance abuse. The patient does not have insomnia.    Allergic/Immunologic: Negative for HIV exposure and hives.     Objective:     Vital Signs (Most Recent):  Temp: 97.7 °F (36.5 °C) (04/17/17 0909)  Pulse: 73 (04/17/17 1306)  Resp: 19 (04/17/17 1215)  BP: 121/85 (04/17/17 1306)  SpO2: 100 % (04/17/17 1306) Vital Signs (24h Range):  Temp:  [97.7 °F (36.5 °C)] 97.7 °F (36.5 °C)  Pulse:  [51-73] 73  Resp:  [16-19] 19  SpO2:  [98 %-100 %] 100 %  BP: (115-145)/(68-85) 121/85     Weight: 70.3 kg (155  lb)  Body mass index is 21.62 kg/(m^2).    SpO2: 100 %  O2 Device (Oxygen Therapy): room air    No intake or output data in the 24 hours ending 04/17/17 1400    Lines/Drains/Airways     Peripheral Intravenous Line                 Peripheral IV - Single Lumen 04/17/17 1037 Right Antecubital less than 1 day                Physical Exam   Constitutional: She is oriented to person, place, and time. She appears well-nourished.   HENT:   Head: Normocephalic.   Eyes: Pupils are equal, round, and reactive to light.   Neck: Normal carotid pulses and no JVD present. Carotid bruit is not present. No thyromegaly present.   Cardiovascular: Regular rhythm, normal heart sounds and normal pulses.   No extrasystoles are present. Bradycardia present.  PMI is not displaced.  Exam reveals no gallop and no S3.    No murmur heard.  Pulmonary/Chest: Breath sounds normal. No stridor. No respiratory distress.   Abdominal: Soft. Bowel sounds are normal. There is no tenderness. There is no rebound.   Musculoskeletal: Normal range of motion.   Neurological: She is alert and oriented to person, place, and time.   Skin: Skin is intact. No rash noted.   Psychiatric: Her behavior is normal.       Significant Labs:   ABG: No results for input(s): PH, PCO2, HCO3, POCSATURATED, BE in the last 48 hours., Blood Culture: No results for input(s): LABBLOO in the last 48 hours., BMP:   Recent Labs  Lab 04/17/17  1037   GLU 87      K 4.3      CO2 29   BUN 15   CREATININE 0.9   CALCIUM 9.9   MG 2.3   , CMP   Recent Labs  Lab 04/17/17  1037      K 4.3      CO2 29   GLU 87   BUN 15   CREATININE 0.9   CALCIUM 9.9   PROT 7.8   ALBUMIN 4.4   BILITOT 1.1*   ALKPHOS 97   AST 21   ALT 20   ANIONGAP 10   ESTGFRAFRICA >60   EGFRNONAA >60   , CBC   Recent Labs  Lab 04/17/17  1037   WBC 4.15   HGB 14.7   HCT 42.7      , INR   Recent Labs  Lab 04/17/17  1037   INR 1.0   , Lipid Panel No results for input(s): CHOL, HDL, LDLCALC, TRIG,  CHOLHDL in the last 48 hours. and Troponin   Recent Labs  Lab 04/17/17  1037   TROPONINI <0.006       Significant Imaging: X-Ray: CXR: X-Ray Chest 1 View (CXR):   Results for orders placed or performed during the hospital encounter of 04/17/17   X-Ray Chest 1 View    Narrative    History: Dizziness, shortness of breath    There is cardiomegaly. No infiltrate seen. Haziness of the lower lung fields produced by prominent overlying soft tissues and breast implants. There is moderate scoliosis.    Impression     No acute findings. Cardiomegaly. Scoliosis.      Electronically signed by: CATALINA HARRINGTON MD  Date:     04/17/17  Time:    10:25      Assessment and Plan:     Orthostatic tachycardia symptomatic  baselie bradycardia at 40's.  Anxiety and bipolar.    Plan: IVF   Echo pending  Tele monitor    VTE Risk Mitigation     None          Thank you for your consult. I will follow-up with patient. Please contact us if you have any additional questions.    Loco Vergara MD  Cardiology   [facility]

## 2017-04-19 NOTE — TELEPHONE ENCOUNTER
----- Message from Myra Jang sent at 4/19/2017  4:00 PM CDT -----  Contact: pt  Pt request call back concerning test results, pt can be reached at [phone]///[id number]

## 2017-05-01 ENCOUNTER — OFFICE VISIT (OUTPATIENT)
Dept: CARDIOLOGY | Facility: CLINIC | Age: 32
End: 2017-05-01
Payer: MEDICARE

## 2017-05-01 VITALS
DIASTOLIC BLOOD PRESSURE: 82 MMHG | WEIGHT: 169.06 LBS | OXYGEN SATURATION: 99 % | HEIGHT: 71 IN | BODY MASS INDEX: 23.67 KG/M2 | HEART RATE: 59 BPM | SYSTOLIC BLOOD PRESSURE: 122 MMHG

## 2017-05-01 DIAGNOSIS — R55 SYNCOPE AND COLLAPSE: Primary | ICD-10-CM

## 2017-05-01 PROCEDURE — 99999 PR PBB SHADOW E&M-EST. PATIENT-LVL III: CPT | Mod: PBBFAC,,, | Performed by: INTERNAL MEDICINE

## 2017-05-01 PROCEDURE — 99213 OFFICE O/P EST LOW 20 MIN: CPT | Mod: PBBFAC | Performed by: INTERNAL MEDICINE

## 2017-05-01 PROCEDURE — 99214 OFFICE O/P EST MOD 30 MIN: CPT | Mod: S$PBB,,, | Performed by: INTERNAL MEDICINE

## 2017-05-01 RX ORDER — MIDODRINE HYDROCHLORIDE 5 MG/1
5 TABLET ORAL 2 TIMES DAILY WITH MEALS
Qty: 60 TABLET | Refills: 11 | Status: SHIPPED | OUTPATIENT
Start: 2017-05-01 | End: 2019-01-26

## 2017-05-01 NOTE — MR AVS SNAPSHOT
[facility]  [address] [facility] [address]  Phone: [phone]  Fax: [phone]                  Aliyah Up   2017 11:20 AM   Office Visit    Description:  Female : 1985   Provider:  Loco Vergara MD   Department:  [facility]           Diagnoses this Visit        Comments    Syncope and collapse    -  Primary            To Do List           Goals (5 Years of Data)     None      Follow-Up and Disposition     Return in about 3 months (around 2017).       These Medications        Disp Refills Start End    midodrine (PROAMATINE) 5 MG Tab 60 tablet 11 2017    Take 1 tablet (5 mg total) by mouth 2 (two) times daily with meals. - Oral    Pharmacy: [facility]/pharmacy #[id number] [address] #: [phone]         [facility]     [facility] On Call Nurse Care Line -  Assistance  Unless otherwise directed by your provider, please contact [facility], our nurse care line that is available for  assistance.     Registered nurses in the [facility] provide: appointment scheduling, clinical advisement, health education, and other advisory services.  Call: [phone] (toll free)               Medications           Message regarding Medications     Verify the changes and/or additions to your medication regime listed below are the same as discussed with your clinician today.  If any of these changes or additions are incorrect, please notify your healthcare provider.        START taking these NEW medications        Refills    midodrine (PROAMATINE) 5 MG Tab 11    Sig: Take 1 tablet (5 mg total) by mouth 2 (two) times daily with meals.    Class: Normal    Route: Oral           Verify that the below list of medications is an accurate representation of the medications you are currently taking.  If none reported, the list may be blank. If incorrect, please contact your healthcare provider. Carry this list  "with you in case of emergency.           Current Medications     alprazolam (XANAX) 2 MG Tab Take by mouth nightly as needed.    midodrine (PROAMATINE) 5 MG Tab Take 1 tablet (5 mg total) by mouth 2 (two) times daily with meals.           Clinical Reference Information           Your Vitals Were     BP Pulse Height Weight SpO2 BMI    122/82 (BP Location: Right arm, Patient Position: Sitting, BP Method: Manual) 59 5' 11" (1.803 m) 76.7 kg (169 lb 1.5 oz) 99% 23.58 kg/m2      Blood Pressure          Most Recent Value    Right Arm BP - Sitting  122/82    Left Arm BP - Sitting  120/80    BP  122/82      Allergies as of 5/1/2017     No Known Allergies      Immunizations Administered on Date of Encounter - 5/1/2017     None      [facility] Sign-Up     Activating your [facility] account is as easy as 1-2-3!     1) Visit [email or web address], select Sign Up Now, enter this activation code and your date of birth, then select Next.  [email or web address]  Expires: 6/2/2017  2:46 PM      2) Create a username and password to use when you visit [facility] in the future and select a security question in case you lose your password and select Next.    3) Enter your e-mail address and click Sign Up!    Additional Information  If you have questions, please e-mail [email or web address] or call [phone] to talk to our [facility] staff. Remember, [facility] is NOT to be used for urgent needs. For medical emergencies, dial 911.         Language Assistance Services     ATTENTION: Language assistance services are available, free of charge. Please call [phone].      ATENCIÓN: Si habla español, tiene a stark disposición servicios gratuitos de asistencia lingüística. Llame al [phone].     CHÚ Ý: N?u b?n nói Ti?ng Vi?t, có các d?ch v? h? tr? ngôn ng? mi?n phí dành cho b?n. G?i s? [phone].         [facility] complies with applicable Federal civil rights laws and does not discriminate on the basis of race, color, national " origin, age, disability, or sex.

## 2017-05-01 NOTE — PROGRESS NOTES
Subjective:   Patient ID:  Aliyah Up is a 31 y.o. female who presents for follow up of No chief complaint on file.      HPI Comments: 30 yo female, PMH anxiety,bipolar and depression, working as receptionalist.   , was admitted to [facility] due to lightheaded and fainting when standing up at home. Associated with black vision for a moment. Felt vision moving behind the turning of head and the head was Leaving her body. Constant symptoms for a day. She states that she always has lightheadedness and dizziness when standing up, which could resovle quickly in seconds. Aggressive IVF did not improve her symptoms. In ER, EKG showed sinus bradycardia at 45 bpm and had one liter of IVF.  Tilt table test done as op confirmed the Dx of orthostatic tachycardia. Pressure Hose was advised.  Today, still has dizziness, and fainting intermittently.        Past Medical History:   Diagnosis Date    Anxiety     Bipolar and related disorder     Depression     Memory loss     Mental disorder     Syncope and collapse        Past Surgical History:   Procedure Laterality Date    Breast implants  2005     SECTION, CLASSIC      HYSTERECTOMY         Social History   Substance Use Topics    Smoking status: Former Smoker     Quit date: 2013    Smokeless tobacco: None    Alcohol use Yes      Comment: OCCASION       Family History   Problem Relation Age of Onset    Hypertension Mother          Review of Systems   Constitution: Negative for decreased appetite, diaphoresis, fever, weakness, malaise/fatigue and night sweats.   HENT: Negative for headaches and nosebleeds.    Eyes: Negative for blurred vision and double vision.   Cardiovascular: Negative for chest pain, claudication, dyspnea on exertion, irregular heartbeat, leg swelling, near-syncope, orthopnea, palpitations, paroxysmal nocturnal dyspnea and syncope.   Respiratory: Negative for cough, shortness of breath, sleep disturbances due to  breathing, snoring, sputum production and wheezing.    Endocrine: Negative for cold intolerance and polyuria.   Hematologic/Lymphatic: Does not bruise/bleed easily.   Skin: Negative for rash.   Musculoskeletal: Negative for back pain, falls, joint pain, joint swelling and neck pain.   Gastrointestinal: Negative for abdominal pain, heartburn, nausea and vomiting.   Genitourinary: Negative for dysuria, frequency and hematuria.   Neurological: Positive for dizziness and light-headedness. Negative for difficulty with concentration, focal weakness, numbness and seizures.   Psychiatric/Behavioral: Negative for depression, memory loss and substance abuse. The patient does not have insomnia.    Allergic/Immunologic: Negative for HIV exposure and hives.       Objective:   Physical Exam   Constitutional: She is oriented to person, place, and time. She appears well-nourished.   HENT:   Head: Normocephalic.   Eyes: Pupils are equal, round, and reactive to light.   Neck: Normal carotid pulses and no JVD present. Carotid bruit is not present. No thyromegaly present.   Cardiovascular: Normal rate, regular rhythm, normal heart sounds and normal pulses.   No extrasystoles are present. PMI is not displaced.  Exam reveals no gallop and no S3.    No murmur heard.  Pulmonary/Chest: Breath sounds normal. No stridor. No respiratory distress.   Abdominal: Soft. Bowel sounds are normal. There is no tenderness. There is no rebound.   Musculoskeletal: Normal range of motion.   Neurological: She is alert and oriented to person, place, and time.   Skin: Skin is intact. No rash noted.   Psychiatric: Her behavior is normal.       No results found for: CHOL  No results found for: HDL  No results found for: LDLCALC  No results found for: TRIG  No results found for: CHOLHDL    Chemistry        Component Value Date/Time     04/18/2017 0541    K 4.3 04/18/2017 0541     04/18/2017 0541    CO2 24 04/18/2017 0541    BUN 15 04/18/2017 0541     CREATININE 0.9 04/18/2017 0541    GLU 99 04/18/2017 0541        Component Value Date/Time    CALCIUM 8.4 (L) 04/18/2017 0541    ALKPHOS 76 04/18/2017 0541    AST 14 04/18/2017 0541    ALT 16 04/18/2017 0541    BILITOT 0.2 04/18/2017 0541          Lab Results   Component Value Date    TSH 2.490 02/23/2014     Lab Results   Component Value Date    INR 1.0 04/17/2017    INR 1.0 09/20/2015     Lab Results   Component Value Date    WBC 4.55 04/18/2017    HGB 12.5 04/18/2017    HCT 37.4 04/18/2017    MCV 98 04/18/2017     04/18/2017     BMP  Sodium   Date Value Ref Range Status   04/18/2017 142 136 - 145 mmol/L Final     Potassium   Date Value Ref Range Status   04/18/2017 4.3 3.5 - 5.1 mmol/L Final     Chloride   Date Value Ref Range Status   04/18/2017 110 95 - 110 mmol/L Final     CO2   Date Value Ref Range Status   04/18/2017 24 23 - 29 mmol/L Final     BUN, Bld   Date Value Ref Range Status   04/18/2017 15 6 - 20 mg/dL Final     Creatinine   Date Value Ref Range Status   04/18/2017 0.9 0.5 - 1.4 mg/dL Final     Calcium   Date Value Ref Range Status   04/18/2017 8.4 (L) 8.7 - 10.5 mg/dL Final     Anion Gap   Date Value Ref Range Status   04/18/2017 8 8 - 16 mmol/L Final     eGFR if    Date Value Ref Range Status   04/18/2017 >60 >60 mL/min/1.73 m^2 Final     eGFR if non    Date Value Ref Range Status   04/18/2017 >60 >60 mL/min/1.73 m^2 Final     Comment:     Calculation used to obtain the estimated glomerular filtration  rate (eGFR) is the CKD-EPI equation. Since race is unknown   in our information system, the eGFR values for   -American and Non--American patients are given   for each creatinine result.       Estimated Creatinine Clearance: 100.3 mL/min (based on Cr of 0.9).     Assessment:      1. Syncope and collapse      +orthostatic tachycardia   Plan:   Add midodrine (PROAMATINE) 5 MG Tab;   advise Salty food and hydration  Continue Pressure hose.  RTC in 3  months.

## 2018-03-17 ENCOUNTER — HOSPITAL ENCOUNTER (EMERGENCY)
Facility: HOSPITAL | Age: 33
Discharge: HOME | End: 2018-03-17
Attending: EMERGENCY MEDICINE
Payer: MEDICARE

## 2018-03-17 VITALS
TEMPERATURE: 98 F | WEIGHT: 170 LBS | HEART RATE: 91 BPM | DIASTOLIC BLOOD PRESSURE: 96 MMHG | RESPIRATION RATE: 18 BRPM | SYSTOLIC BLOOD PRESSURE: 142 MMHG | BODY MASS INDEX: 23.8 KG/M2 | OXYGEN SATURATION: 97 % | HEIGHT: 71 IN

## 2018-03-17 DIAGNOSIS — F41.0 PANIC ATTACK: Primary | ICD-10-CM

## 2018-03-17 PROCEDURE — 96372 THER/PROPH/DIAG INJ SC/IM: CPT

## 2018-03-17 PROCEDURE — 99284 EMERGENCY DEPT VISIT MOD MDM: CPT | Mod: 25

## 2018-03-17 PROCEDURE — 63600175 PHARM REV CODE 636 W HCPCS: Performed by: EMERGENCY MEDICINE

## 2018-03-17 RX ORDER — BUSPIRONE HYDROCHLORIDE 15 MG/1
15 TABLET ORAL 3 TIMES DAILY
COMMUNITY
End: 2019-01-26

## 2018-03-17 RX ORDER — ESCITALOPRAM OXALATE 20 MG/1
20 TABLET ORAL DAILY
COMMUNITY
End: 2019-01-26

## 2018-03-17 RX ORDER — BUPROPION HYDROCHLORIDE 300 MG/1
300 TABLET ORAL DAILY
Status: ON HOLD | COMMUNITY
End: 2019-02-02 | Stop reason: HOSPADM

## 2018-03-17 RX ADMIN — LORAZEPAM 2 MG: 2 INJECTION INTRAMUSCULAR; INTRAVENOUS at 03:03

## 2018-03-17 NOTE — ED PROVIDER NOTES
SCRIBE #1 NOTE: I, Anastasia De Leon, am scribing for, and in the presence of, Jaden Leong MD. I have scribed the entire note.      History      Chief Complaint   Patient presents with    Anxiety       Review of patient's allergies indicates:  No Known Allergies     HPI   HPI    3/17/2018, 2:58 AM   History obtained from the patient      History of Present Illness: Aliyah Up is a 32 y.o. female patient with Hx of panic attacks who presents to the Emergency Department for anxiety which onset gradually Friday evening. Symptoms are constant and moderate in severity. Pt reports increased stress today. No mitigating or exacerbating factors reported. Associated sxs include nervousness, SOB , and lightheadedness. Patient denies any self-harm, SI , HI , CP , HA , N/V, and all other sxs at this time. Prior Tx includes Xanax with no relief. No further complaints or concerns at this time.         Arrival mode: AASI    PCP: Catalino Mcgrath MD       Past Medical History:  Past Medical History:   Diagnosis Date    Anxiety     Bipolar and related disorder     Depression     Memory loss     Mental disorder     Syncope and collapse        Past Surgical History:  Past Surgical History:   Procedure Laterality Date    Breast implants  2005     SECTION, CLASSIC  2003    HYSTERECTOMY           Family History:  Family History   Problem Relation Age of Onset    Hypertension Mother        Social History:  Social History     Social History Main Topics    Smoking status: Former Smoker     Quit date: 2013    Smokeless tobacco: Not on file    Alcohol use Yes      Comment: OCCASION    Drug use: No    Sexual activity: Yes     Partners: Male       ROS   Review of Systems   Constitutional: Negative for chills, diaphoresis and fever.   HENT: Negative for congestion, rhinorrhea and sore throat.    Respiratory: Positive for shortness of breath. Negative for cough.    Cardiovascular: Negative for chest pain.  "  Gastrointestinal: Negative for nausea and vomiting.   Genitourinary: Negative for dysuria and hematuria.   Musculoskeletal: Negative for back pain, neck pain and neck stiffness.   Neurological: Positive for light-headedness. Negative for syncope and headaches.   Psychiatric/Behavioral: Negative for self-injury and suicidal ideas. The patient is nervous/anxious.         (-) HI       Physical Exam      Initial Vitals [03/17/18 0252]   BP Pulse Resp Temp SpO2   (!) 130/90 110 18 98.4 °F (36.9 °C) 99 %      MAP       103.33          Physical Exam  Nursing Notes and Vital Signs Reviewed.  Constitutional: Patient is in mild distress. Well-developed and well-nourished.  Head: Atraumatic. Normocephalic.  Eyes: PERRL. EOM intact. Conjunctivae are not pale. No scleral icterus.    Neck: Supple. Full ROM. No lymphadenopathy.  Cardiovascular: Regular rate. Regular rhythm. No murmurs, rubs, or gallops. Distal pulses are 2+ and symmetric.  Pulmonary/Chest: No respiratory distress. Clear to auscultation bilaterally. No wheezing or rales.  Abdominal: Soft and non-distended.  There is no tenderness.   Musculoskeletal: Moves all extremities. No obvious deformities. No edema. No calf tenderness.  Skin: Warm and dry.  Neurological:  Alert, awake, and appropriate.  Normal speech.  No acute focal neurological deficits are appreciated.  Psychiatric: Normal affect. Good eye contact. Appropriate in content.    ED Course    Procedures  ED Vital Signs:  Vitals:    03/17/18 0252 03/17/18 0410   BP: (!) 130/90 (!) 142/96   Pulse: 110 91   Resp: 18 18   Temp: 98.4 °F (36.9 °C) 98 °F (36.7 °C)   TempSrc:  Oral   SpO2: 99% 97%   Weight: 77.1 kg (170 lb)    Height: 5' 11" (1.803 m)                 The Emergency Provider reviewed the vital signs and test results, which are outlined above.    ED Discussion     3:49 AM: Reassessed pt at this time. Discussed with pt all pertinent ED information and results. Discussed pt dx and plan of tx. Gave pt all " f/u and return to the ED instructions. All questions and concerns were addressed at this time. Pt expresses understanding of information and instructions, and is comfortable with plan to discharge. Pt is stable for discharge.    I discussed with patient and/or family/caretaker that evaluation in the ED does not suggest any emergent or life threatening medical conditions requiring immediate intervention beyond what was provided in the ED, and I believe patient is safe for discharge.  Regardless, an unremarkable evaluation in the ED does not preclude the development or presence of a serious of life threatening condition. As such, patient was instructed to return immediately for any worsening or change in current symptoms.      ED Medication(s):  Medications   lorazepam (ATIVAN) injection 2 mg (2 mg Intramuscular Given 3/17/18 0334)       Discharge Medication List as of 3/17/2018  3:55 AM          Follow-up Information     Catalino Mcgrath MD In 2 days.    Specialty:  Internal Medicine  Contact information:  [address] [facility] [address]  [phone]             [facility]    Specialty:  Emergency Medicine  Why:  As needed  Contact information:  [address] [facility] [address] [phone]                   Medical Decision Making              Scribe Attestation:   Scribe #1: I performed the above scribed service and the documentation accurately describes the services I performed. I attest to the accuracy of the note.    Attending:   Physician Attestation Statement for Scribe #1: I, Jaden Leong MD, personally performed the services described in this documentation, as scribed by Anastasia De Leon, in my presence, and it is both accurate and complete.          Clinical Impression       ICD-10-CM ICD-9-CM   1. Panic attack F41.0 300.01       Disposition:   Disposition: Discharged  Condition: Stable         Jaden Leong MD  03/17/18  [id number]

## 2018-03-17 NOTE — ED NOTES
"Pt comes up to nursing station asking if she can leave because her anxiety "is too bad to stay in the room". Pt escorted into assigned room. Pt complains of "alot going on right now". Pt denies wanting to talk about situation. Pt states she has had bad anxiety her whole life and says that it gets this bad often and has to seek medical attention.   "

## 2019-01-25 ENCOUNTER — NURSE TRIAGE (OUTPATIENT)
Dept: ADMINISTRATIVE | Facility: CLINIC | Age: 34
End: 2019-01-25

## 2019-01-26 ENCOUNTER — HOSPITAL ENCOUNTER (EMERGENCY)
Facility: HOSPITAL | Age: 34
Discharge: HOME | End: 2019-01-26
Attending: EMERGENCY MEDICINE
Payer: MEDICARE

## 2019-01-26 VITALS
SYSTOLIC BLOOD PRESSURE: 128 MMHG | BODY MASS INDEX: 23.24 KG/M2 | DIASTOLIC BLOOD PRESSURE: 76 MMHG | WEIGHT: 166 LBS | OXYGEN SATURATION: 99 % | HEART RATE: 86 BPM | RESPIRATION RATE: 16 BRPM | HEIGHT: 71 IN | TEMPERATURE: 98 F

## 2019-01-26 DIAGNOSIS — F32.A DEPRESSION, UNSPECIFIED DEPRESSION TYPE: ICD-10-CM

## 2019-01-26 PROBLEM — F33.2 MAJOR DEPRESSIVE DISORDER, RECURRENT SEVERE WITHOUT PSYCHOTIC FEATURES: Status: ACTIVE | Noted: 2019-01-26

## 2019-01-26 LAB
ALBUMIN SERPL BCP-MCNC: 4.5 G/DL
ALP SERPL-CCNC: 95 U/L
ALT SERPL W/O P-5'-P-CCNC: 22 U/L
AMPHET+METHAMPHET UR QL: NEGATIVE
ANION GAP SERPL CALC-SCNC: 12 MMOL/L
APAP SERPL-MCNC: <3 UG/ML
AST SERPL-CCNC: 25 U/L
B-HCG UR QL: NEGATIVE
BARBITURATES UR QL SCN>200 NG/ML: NEGATIVE
BASOPHILS # BLD AUTO: 0.04 K/UL
BASOPHILS NFR BLD: 0.8 %
BENZODIAZ UR QL SCN>200 NG/ML: ABNORMAL
BILIRUB SERPL-MCNC: 0.3 MG/DL
BILIRUB UR QL STRIP: NEGATIVE
BUN SERPL-MCNC: 10 MG/DL
BZE UR QL SCN: NEGATIVE
CALCIUM SERPL-MCNC: 9.5 MG/DL
CANNABINOIDS UR QL SCN: NEGATIVE
CHLORIDE SERPL-SCNC: 101 MMOL/L
CLARITY UR: CLEAR
CO2 SERPL-SCNC: 24 MMOL/L
COLOR UR: YELLOW
CREAT SERPL-MCNC: 1 MG/DL
CREAT UR-MCNC: 11.7 MG/DL
DIFFERENTIAL METHOD: ABNORMAL
EOSINOPHIL # BLD AUTO: 0.1 K/UL
EOSINOPHIL NFR BLD: 1 %
ERYTHROCYTE [DISTWIDTH] IN BLOOD BY AUTOMATED COUNT: 12.8 %
EST. GFR  (AFRICAN AMERICAN): >60 ML/MIN/1.73 M^2
EST. GFR  (NON AFRICAN AMERICAN): >60 ML/MIN/1.73 M^2
ETHANOL SERPL-MCNC: 22 MG/DL
GLUCOSE SERPL-MCNC: 59 MG/DL
GLUCOSE UR QL STRIP: NEGATIVE
HCT VFR BLD AUTO: 42.7 %
HGB BLD-MCNC: 14.5 G/DL
HGB UR QL STRIP: NEGATIVE
KETONES UR QL STRIP: NEGATIVE
LEUKOCYTE ESTERASE UR QL STRIP: ABNORMAL
LYMPHOCYTES # BLD AUTO: 1.7 K/UL
LYMPHOCYTES NFR BLD: 32.3 %
MCH RBC QN AUTO: 33.3 PG
MCHC RBC AUTO-ENTMCNC: 34 G/DL
MCV RBC AUTO: 98 FL
METHADONE UR QL SCN>300 NG/ML: NEGATIVE
MICROSCOPIC COMMENT: NORMAL
MONOCYTES # BLD AUTO: 0.4 K/UL
MONOCYTES NFR BLD: 7.8 %
NEUTROPHILS # BLD AUTO: 3.1 K/UL
NEUTROPHILS NFR BLD: 58.1 %
NITRITE UR QL STRIP: NEGATIVE
OPIATES UR QL SCN: NEGATIVE
PCP UR QL SCN>25 NG/ML: NEGATIVE
PH UR STRIP: 6 [PH] (ref 5–8)
PLATELET # BLD AUTO: 360 K/UL
PMV BLD AUTO: 9.5 FL
POTASSIUM SERPL-SCNC: 3.8 MMOL/L
PROT SERPL-MCNC: 7.8 G/DL
PROT UR QL STRIP: NEGATIVE
RBC # BLD AUTO: 4.35 M/UL
SALICYLATES SERPL-MCNC: <5 MG/DL
SODIUM SERPL-SCNC: 137 MMOL/L
SP GR UR STRIP: <=1.005 (ref 1–1.03)
SQUAMOUS #/AREA URNS HPF: 2 /HPF
TOXICOLOGY INFORMATION: ABNORMAL
TSH SERPL DL<=0.005 MIU/L-ACNC: 0.82 UIU/ML
URN SPEC COLLECT METH UR: ABNORMAL
UROBILINOGEN UR STRIP-ACNC: NEGATIVE EU/DL
WBC # BLD AUTO: 5.24 K/UL
WBC #/AREA URNS HPF: 2 /HPF (ref 0–5)

## 2019-01-26 PROCEDURE — 80307 DRUG TEST PRSMV CHEM ANLYZR: CPT

## 2019-01-26 PROCEDURE — 81025 URINE PREGNANCY TEST: CPT

## 2019-01-26 PROCEDURE — 80053 COMPREHEN METABOLIC PANEL: CPT

## 2019-01-26 PROCEDURE — 81000 URINALYSIS NONAUTO W/SCOPE: CPT | Mod: 59

## 2019-01-26 PROCEDURE — G0425 INPT/ED TELECONSULT30: HCPCS | Mod: ,,, | Performed by: PSYCHIATRY & NEUROLOGY

## 2019-01-26 PROCEDURE — 84443 ASSAY THYROID STIM HORMONE: CPT

## 2019-01-26 PROCEDURE — 99283 EMERGENCY DEPT VISIT LOW MDM: CPT

## 2019-01-26 PROCEDURE — 80320 DRUG SCREEN QUANTALCOHOLS: CPT

## 2019-01-26 PROCEDURE — 36415 COLL VENOUS BLD VENIPUNCTURE: CPT

## 2019-01-26 PROCEDURE — 80329 ANALGESICS NON-OPIOID 1 OR 2: CPT

## 2019-01-26 PROCEDURE — G0425 PR INPT TELEHEALTH CONSULT 30M: ICD-10-PCS | Mod: ,,, | Performed by: PSYCHIATRY & NEUROLOGY

## 2019-01-26 PROCEDURE — 85025 COMPLETE CBC W/AUTO DIFF WBC: CPT

## 2019-01-26 RX ORDER — DULOXETIN HYDROCHLORIDE 60 MG/1
60 CAPSULE, DELAYED RELEASE ORAL DAILY
Status: ON HOLD | COMMUNITY
End: 2019-02-02 | Stop reason: HOSPADM

## 2019-01-26 RX ORDER — ARIPIPRAZOLE 2 MG/1
5 TABLET ORAL DAILY
Status: ON HOLD | COMMUNITY
End: 2019-02-02 | Stop reason: HOSPADM

## 2019-01-26 RX ORDER — TRAZODONE HYDROCHLORIDE 100 MG/1
200 TABLET ORAL NIGHTLY
Status: ON HOLD | COMMUNITY
End: 2019-02-02 | Stop reason: HOSPADM

## 2019-01-26 NOTE — TELEPHONE ENCOUNTER
Reason for Disposition   Message left on identified answering machine.    Protocols used: ST NO CONTACT OR DUPLICATE CONTACT CALL-A-AH

## 2019-01-26 NOTE — ED NOTES
Formal Voluntary Admit form signed by patient, provider, and RN witness. All patients questions answered at this time.

## 2019-01-26 NOTE — ED NOTES
Report called to BRAULIO Childers at [facility]. Patient will be a voluntary admit and will be arriving to facility by private vehicle with .

## 2019-01-26 NOTE — ED PROVIDER NOTES
SCRIBE #1 NOTE: I, Marcycary Leong, am scribing for, and in the presence of, Martin Bazan MD. I have scribed the entire note.       History     Chief Complaint   Patient presents with    Psychiatric Evaluation     pt reports increased depression x 1 month. Pt reports cut self 2 days ago. currently denies si/hi     Review of patient's allergies indicates:  No Known Allergies      History of Present Illness     HPI    2019, 11:01 AM  History obtained from the patient      History of Present Illness: Aliyah Rahman is a 33 y.o. female patient with a PMHx of BPD, paranoid schizophrenia, and anxiety who presents to the Emergency Department for evaluation of increased depression over the last month. Symptoms are constant and moderate in severity. No mitigating or exacerbating factors reported. Pt states she does not remember hurting herself, but she does have lacerations to her L forearm. She admits to getting less sleep at night. Pt states she is not visually hallucinating, but she does hear people talking about her constantly. Patient denies any fever, chills, confusion, HI, SI, n/v/d, abd pain, CP, SOB, appetite change, and all other sxs at this time. No further complaints or concerns at this time.       Arrival mode: Personal vehicle      PCP: Catalino Mcgrath MD        Past Medical History:  Past Medical History:   Diagnosis Date    Anxiety     Bipolar and related disorder     Depression     Memory loss     Mental disorder     Syncope and collapse        Past Surgical History:  Past Surgical History:   Procedure Laterality Date    Breast implants  2005     SECTION, CLASSIC  2003    HYSTERECTOMY      TILT TABLE TEST N/A 2017    Performed by Loco Vergara MD at [facility] CATH LAB         Family History:  Family History   Problem Relation Age of Onset    Hypertension Mother        Social History:  Social History     Tobacco Use    Smoking status: Former Smoker     Last attempt  to quit: 2013     Years since quittin.9   Substance and Sexual Activity    Alcohol use: Yes     Frequency: Monthly or less     Comment: OCCASION    Drug use: No    Sexual activity: Yes     Partners: Male        Review of Systems     Review of Systems   Constitutional: Negative for appetite change, fatigue and fever.        (+) decreased amount of sleep   HENT: Negative for congestion, rhinorrhea and sore throat.    Respiratory: Negative for cough and shortness of breath.    Cardiovascular: Negative for chest pain.   Gastrointestinal: Negative for abdominal pain, diarrhea, nausea and vomiting.   Genitourinary: Negative for dysuria, frequency and hematuria.   Musculoskeletal: Negative for back pain and neck pain.   Skin: Negative for rash.   Neurological: Negative for dizziness, weakness and headaches.   Hematological: Does not bruise/bleed easily.   Psychiatric/Behavioral: Positive for hallucinations (Auditory) and self-injury (Lacerations to L forearm). Negative for confusion and suicidal ideas. The patient is not nervous/anxious.         (-) HI   All other systems reviewed and are negative.       Physical Exam     Initial Vitals [19 1052]   BP Pulse Resp Temp SpO2   (!) 150/86 91 18 97 °F (36.1 °C) 99 %      MAP       --          Physical Exam  Nursing Notes and Vital Signs Reviewed.  Constitutional: Patient is in mild distress. Well-developed and well-nourished.  Head: Atraumatic. Normocephalic.  Eyes: PERRL. EOM intact. Conjunctivae are not pale. No scleral icterus.  ENT: Mucous membranes are moist. Oropharynx is clear and symmetric.    Neck: Supple. Full ROM. No lymphadenopathy.  Cardiovascular: Regular rate. Regular rhythm. No murmurs, rubs, or gallops. Distal pulses are 2+ and symmetric.  Pulmonary/Chest: No respiratory distress. Clear to auscultation bilaterally. No wheezing or rales.  Abdominal: Soft and non-distended.  There is no tenderness.  No rebound, guarding, or rigidity.  "  Musculoskeletal: Moves all extremities. No obvious deformities. No edema.  Skin: Warm and dry.  Neurological:  Alert and awake.  Normal speech.  No acute focal neurological deficits are appreciated.  Psychiatric:               Behavior: cooperative              Mood and Affect: depressed affect              Thought Process: within normal limits              Suicidal Ideations: No              Suicidal Plan: No specific plan to harm self              Homicidal Ideations: No              Hallucinations: auditory     ED Course   Procedures  ED Vital Signs:  Vitals:    01/26/19 1052 01/26/19 1415   BP: (!) 150/86 128/76   Pulse: 91 86   Resp: 18 16   Temp: 97 °F (36.1 °C) 97.6 °F (36.4 °C)   TempSrc: Oral Oral   SpO2: 99% 99%   Weight: 75.3 kg (166 lb)    Height: 5' 11" (1.803 m)        Abnormal Lab Results:  Labs Reviewed   CBC W/ AUTO DIFFERENTIAL - Abnormal; Notable for the following components:       Result Value    MCH 33.3 (*)     Platelets 360 (*)     All other components within normal limits   COMPREHENSIVE METABOLIC PANEL - Abnormal; Notable for the following components:    Glucose 59 (*)     All other components within normal limits   URINALYSIS, REFLEX TO URINE CULTURE - Abnormal; Notable for the following components:    Specific Gravity, UA <=1.005 (*)     Leukocytes, UA Trace (*)     All other components within normal limits    Narrative:     Preferred Collection Type->Urine, Clean Catch   DRUG SCREEN PANEL, URINE EMERGENCY - Abnormal; Notable for the following components:    Creatinine, Random Ur 11.7 (*)     All other components within normal limits    Narrative:     Preferred Collection Type->Urine, Clean Catch   ALCOHOL,MEDICAL (ETHANOL) - Abnormal; Notable for the following components:    Alcohol, Medical, Serum 22 (*)     All other components within normal limits   ACETAMINOPHEN LEVEL - Abnormal; Notable for the following components:    Acetaminophen (Tylenol), Serum <3.0 (*)     All other " components within normal limits   SALICYLATE LEVEL - Abnormal; Notable for the following components:    Salicylate Lvl <5.0 (*)     All other components within normal limits   TSH   PREGNANCY TEST, URINE RAPID   URINALYSIS MICROSCOPIC    Narrative:     Preferred Collection Type->Urine, Clean Catch        All Lab Results:  Results for orders placed or performed during the hospital encounter of 01/26/19   CBC auto differential   Result Value Ref Range    WBC 5.24 3.90 - 12.70 K/uL    RBC 4.35 4.00 - 5.40 M/uL    Hemoglobin 14.5 12.0 - 16.0 g/dL    Hematocrit 42.7 37.0 - 48.5 %    MCV 98 82 - 98 fL    MCH 33.3 (H) 27.0 - 31.0 pg    MCHC 34.0 32.0 - 36.0 g/dL    RDW 12.8 11.5 - 14.5 %    Platelets 360 (H) 150 - 350 K/uL    MPV 9.5 9.2 - 12.9 fL    Gran # (ANC) 3.1 1.8 - 7.7 K/uL    Lymph # 1.7 1.0 - 4.8 K/uL    Mono # 0.4 0.3 - 1.0 K/uL    Eos # 0.1 0.0 - 0.5 K/uL    Baso # 0.04 0.00 - 0.20 K/uL    Gran% 58.1 38.0 - 73.0 %    Lymph% 32.3 18.0 - 48.0 %    Mono% 7.8 4.0 - 15.0 %    Eosinophil% 1.0 0.0 - 8.0 %    Basophil% 0.8 0.0 - 1.9 %    Differential Method Automated    Comprehensive metabolic panel   Result Value Ref Range    Sodium 137 136 - 145 mmol/L    Potassium 3.8 3.5 - 5.1 mmol/L    Chloride 101 95 - 110 mmol/L    CO2 24 23 - 29 mmol/L    Glucose 59 (L) 70 - 110 mg/dL    BUN, Bld 10 6 - 20 mg/dL    Creatinine 1.0 0.5 - 1.4 mg/dL    Calcium 9.5 8.7 - 10.5 mg/dL    Total Protein 7.8 6.0 - 8.4 g/dL    Albumin 4.5 3.5 - 5.2 g/dL    Total Bilirubin 0.3 0.1 - 1.0 mg/dL    Alkaline Phosphatase 95 55 - 135 U/L    AST 25 10 - 40 U/L    ALT 22 10 - 44 U/L    Anion Gap 12 8 - 16 mmol/L    eGFR if African American >60 >60 mL/min/1.73 m^2    eGFR if non African American >60 >60 mL/min/1.73 m^2   TSH   Result Value Ref Range    TSH 0.821 0.400 - 4.000 uIU/mL   Urinalysis, Reflex to Urine Culture Urine, Clean Catch   Result Value Ref Range    Specimen UA Urine, Clean Catch     Color, UA Yellow Yellow, Straw, Bita     Appearance, UA Clear Clear    pH, UA 6.0 5.0 - 8.0    Specific Gravity, UA <=1.005 (A) 1.005 - 1.030    Protein, UA Negative Negative    Glucose, UA Negative Negative    Ketones, UA Negative Negative    Bilirubin (UA) Negative Negative    Occult Blood UA Negative Negative    Nitrite, UA Negative Negative    Urobilinogen, UA Negative <2.0 EU/dL    Leukocytes, UA Trace (A) Negative   Drug screen panel, emergency   Result Value Ref Range    Benzodiazepines Presumptive Positive     Methadone metabolites Negative     Cocaine (Metab.) Negative     Opiate Scrn, Ur Negative     Barbiturate Screen, Ur Negative     Amphetamine Screen, Ur Negative     THC Negative     Phencyclidine Negative     Creatinine, Random Ur 11.7 (L) 15.0 - 325.0 mg/dL    Toxicology Information SEE COMMENT    Ethanol   Result Value Ref Range    Alcohol, Medical, Serum 22 (H) <10 mg/dL   Acetaminophen level   Result Value Ref Range    Acetaminophen (Tylenol), Serum <3.0 (L) 10.0 - 20.0 ug/mL   Salicylate level   Result Value Ref Range    Salicylate Lvl <5.0 (L) 15.0 - 30.0 mg/dL   Pregnancy, urine rapid (UPT)   Result Value Ref Range    Preg Test, Ur Negative    Urinalysis Microscopic   Result Value Ref Range    WBC, UA 2 0 - 5 /hpf    Squam Epithel, UA 2 /hpf    Microscopic Comment SEE COMMENT                 The Emergency Provider reviewed the vital signs and test results, which are outlined above.     ED Discussion     1:51 PM: Reassessed pt at this time. Patient is awake, alert, and in NAD. She is medically cleared for voluntary placement in a psychiatric placement. Discussed with pt all pertinent ED information and results. Discussed pt dx and plan of tx. Gave pt all f/u and return to the ED instructions. All questions and concerns were addressed at this time. Pt expresses understanding of information and instructions, and is comfortable with plan to discharge. Pt is stable for discharge.    I discussed with patient and/or family/caretaker that  evaluation in the ED does not suggest any emergent or life threatening medical conditions requiring immediate intervention beyond what was provided in the ED, and I believe patient is safe for discharge.  Regardless, an unremarkable evaluation in the ED does not preclude the development or presence of a serious of life threatening condition. As such, patient was instructed to return immediately for any worsening or change in current symptoms.    ED Medication(s):  Medications - No data to display    Current Discharge Medication List      CONTINUE these medications which have NOT CHANGED    Details   alprazolam (XANAX) 2 MG Tab Take by mouth 2 (two) times daily.       ARIPiprazole (ABILIFY) 2 MG Tab Take 5 mg by mouth once daily.      buPROPion (WELLBUTRIN XL) 300 MG 24 hr tablet Take 300 mg by mouth once daily.      DULoxetine (CYMBALTA) 60 MG capsule Take 60 mg by mouth once daily.      traZODone (DESYREL) 100 MG tablet Take 200 mg by mouth every evening.             Follow-up Information     Catalino Mcgrath MD. Call in 3 days.    Specialty:  Internal Medicine  Why:  As needed  Contact information:  [address] [facility] [address]  [phone]             [facility].                       Medical Decision Making:   Clinical Tests:   Lab Tests: Reviewed and Ordered             Scribe Attestation:   Scribe #1: I performed the above scribed service and the documentation accurately describes the services I performed. I attest to the accuracy of the note.     Attending:   Physician Attestation Statement for Scribe #1: I, Martin Bazan MD, personally performed the services described in this documentation, as scribed by Marcy Leong, in my presence, and it is both accurate and complete.           Clinical Impression       ICD-10-CM ICD-9-CM   1. Depression, unspecified depression type F32.9 311       Disposition:   Disposition:  Discharged  Condition: Stable         Martin Bazan MD  01/26/19 8246

## 2019-01-26 NOTE — CONSULTS
Tele-Consultation to Emergency Department from Psychiatry    Please see previous notes:    Patient agreeable to consultation via telepsychiatry.    Consultation started: 1/26/2019 at 11:45am  The chief complaint leading to psychiatric consultation is: depression  This consultation was requested by Dr. Bazan, the Emergency Department attending physician.  The location of the consulting psychiatrist is [facility].  The patient location is [facility].  The patient arrived at the ED at: not known    Also present with the patient at the time of the consultation:     Patient Identification:  Aliyah Rahman is a 33 y.o. female.    Patient information was obtained from patient.  Patient presented voluntarily to the Emergency Department by private vehicle.    History of Present Illness:  The patient is a 33 year old female with a self reported history of Bipolar Disorder who presents to the ER voicing worsening depression over the last month. She notes that she will feel sad and will sleep frequently and will have negative thoughts. She denies any SI, active or passive. She denies ever having had any SI in the past. She notes, however, that she cut herself 2 days prior but states that she doesn't have awareness of why she did that. The patient states that she is currently taking Xanax 2mg, Wellbutrin XL 300mg, Abilify 2mg, Cymbalta 60mg PO daily and Trazodone 100mg PO QHS, but notes that the medication regimen was recently changed to this. She notes some continued irritability as well. She voices having been hospitalized x 7 secondary to depression in the past as well. She was previously seeing a therapist, but isn't doing so any longer, but states that she follows with a psychiatrist and is scheduled to see that person in 2 weeks.     Psychiatric History:   Hospitalization: Yes  Medication Trials: Yes  Suicide Attempts: no  Violence: no  Depression: yes  Leticia: no  AH's: no  Delusions:  "no    Review of Systems:  Psychological ROS: positive for - depression    Past Medical History:   Past Medical History:   Diagnosis Date    Anxiety     Bipolar and related disorder     Depression     Memory loss     Mental disorder     Syncope and collapse         Seizures: no  Head trauma/l.o.c.: no  Wish to become pregnant[if female of childbearing age]: no    Allergies: nkda  Review of patient's allergies indicates:  No Known Allergies    Medications in ER: Medications - No data to display    Medications at home: Xanax 2mg, Wellbutrin xL 300mg, Cymbalta 60mg PO daily and Trazodone 100mg PO QHS     Substance Abuse History:   Alchohol: no  Drug: no     Legal History:   Past charges/incarcerations: not known  Pending charges: not known    Family Psychiatric History: not known    Social History:   History of Physical/Sexual Abuse: not known  Education: high school    Employment/Disability: not working   Financial: supported by   Relationship Status/Sexual Orientation:    Children: yes   Housing Status: lives with family  Congregational: not known   History: no   Recreational Activities: not known  Access to Gun: denies     Current Evaluation:     Constitutional  Vitals:  Vitals:    01/26/19 1052   BP: (!) 150/86   Pulse: 91   Resp: 18   Temp: 97 °F (36.1 °C)   TempSrc: Oral   SpO2: 99%   Weight: 75.3 kg (166 lb)   Height: 5' 11" (1.803 m)      General:  unremarkable, age appropriate     Musculoskeletal  Muscle Strength/Tone:   moving arms normally   Gait & Station:   sitting on stretcher     Psychiatric  Level of Consciousness: alert  Orientation: oriented to person, place and time  Grooming: in hospital gown  Psychomotor Behavior: no agitation  Speech: normal in rate, rhythm and volume  Language: uses words appropriately  Mood: depressed  Affect: congruent  Thought Process: linear  Associations: goal oriented  Thought Content: denies SI/HI/AVH  Memory: intact  Attention: intact to " interview  Fund of Knowledge: appears adequate  Insight: fair  Judgement: fair    Relevant Elements of Neurological Exam: no abnormality of posture noted    Assessment - Diagnosis - Goals:     Diagnosis/Impression: The patient presents to the ER voicing worsening of depression. She denies any SI, active or passive. She denies any HI, active or passive. She also doesn't appear to be gravely disabled. She doesn't appear to meet criteria to be placed under a PEC. She is neither unwilling nor is she unable to seek admission to the hospital. She does have to continued affective symptoms. She is wanting to go to a psychiatric hospital, and could do so on her own as a formal voluntary admission, but again doesn't meet criteria to be under a PEC. Would recommend that she follow up with her outpatient psychiatrist on Monday as well as look to get re-established in therapy and continue taking her medication regimen and have the psychiatrist adjust it on Monday or she could seek a formal voluntary admission to a psychiatric hospital if she wanted to.     Rec: 1. No PEC needed as she doesn't meet criteria to be under a PEC. 2. Patient can seek formal voluntary admission to a psychiatric hospital if she wants to on her own be admitted to a psychiatric hospital, but again, she doesn't meet criteria to be committed against her will and placed under a PEC. 3. Patient to follow up with her treating psychiatrist on Monday for possible medication recommendations/changes as well as to follow with a therapist.     Time with patient: 10-15 minutes     More than 50% of the time was spent counseling/coordinating care    Laboratory Data:   Labs Reviewed   CBC W/ AUTO DIFFERENTIAL - Abnormal; Notable for the following components:       Result Value    MCH 33.3 (*)     Platelets 360 (*)     All other components within normal limits   COMPREHENSIVE METABOLIC PANEL - Abnormal; Notable for the following components:    Glucose 59 (*)     All  other components within normal limits   URINALYSIS, REFLEX TO URINE CULTURE - Abnormal; Notable for the following components:    Specific Gravity, UA <=1.005 (*)     Leukocytes, UA Trace (*)     All other components within normal limits    Narrative:     Preferred Collection Type->Urine, Clean Catch   DRUG SCREEN PANEL, URINE EMERGENCY - Abnormal; Notable for the following components:    Creatinine, Random Ur 11.7 (*)     All other components within normal limits    Narrative:     Preferred Collection Type->Urine, Clean Catch   ALCOHOL,MEDICAL (ETHANOL) - Abnormal; Notable for the following components:    Alcohol, Medical, Serum 22 (*)     All other components within normal limits   ACETAMINOPHEN LEVEL - Abnormal; Notable for the following components:    Acetaminophen (Tylenol), Serum <3.0 (*)     All other components within normal limits   SALICYLATE LEVEL - Abnormal; Notable for the following components:    Salicylate Lvl <5.0 (*)     All other components within normal limits   URINALYSIS MICROSCOPIC    Narrative:     Preferred Collection Type->Urine, Clean Catch   TSH   PREGNANCY TEST, URINE RAPID         Consulting clinician was informed of the encounter and consult note.    Consultation ended: 1/26/2019 at 12:35pm

## 2019-01-26 NOTE — ED NOTES
Patient accepted by Rohit at [facility] ([address], [place], [place]) for the service of .Patient is a [facility]VA status. Report to be called to [phone].

## 2019-01-26 NOTE — ED NOTES
CPT faxed admit packet to [facility] for possible FVA placement. Awaiting response

## 2019-01-28 PROBLEM — E16.2 HYPOGLYCEMIA: Status: ACTIVE | Noted: 2019-01-28

## 2019-01-28 PROBLEM — D75.839 THROMBOCYTOSIS: Status: ACTIVE | Noted: 2019-01-28
